# Patient Record
Sex: FEMALE | Race: WHITE | NOT HISPANIC OR LATINO | Employment: UNEMPLOYED | URBAN - NONMETROPOLITAN AREA
[De-identification: names, ages, dates, MRNs, and addresses within clinical notes are randomized per-mention and may not be internally consistent; named-entity substitution may affect disease eponyms.]

---

## 2017-01-19 ENCOUNTER — OFFICE VISIT (OUTPATIENT)
Dept: CARDIOLOGY | Facility: CLINIC | Age: 62
End: 2017-01-19

## 2017-01-19 VITALS — DIASTOLIC BLOOD PRESSURE: 79 MMHG | HEART RATE: 74 BPM | SYSTOLIC BLOOD PRESSURE: 118 MMHG

## 2017-01-19 DIAGNOSIS — E78.00 HYPERCHOLESTEREMIA: ICD-10-CM

## 2017-01-19 DIAGNOSIS — I10 ESSENTIAL HYPERTENSION: Primary | ICD-10-CM

## 2017-01-19 DIAGNOSIS — E03.9 HYPOTHYROIDISM, UNSPECIFIED TYPE: ICD-10-CM

## 2017-01-19 DIAGNOSIS — F41.9 ANXIETY: ICD-10-CM

## 2017-01-19 DIAGNOSIS — R06.02 SHORTNESS OF BREATH: ICD-10-CM

## 2017-01-19 PROCEDURE — 99213 OFFICE O/P EST LOW 20 MIN: CPT | Performed by: NURSE PRACTITIONER

## 2017-01-19 RX ORDER — ONDANSETRON 4 MG/1
4 TABLET, FILM COATED ORAL EVERY 8 HOURS PRN
COMMUNITY
End: 2018-03-08 | Stop reason: ALTCHOICE

## 2017-01-19 RX ORDER — ATORVASTATIN CALCIUM 10 MG/1
10 TABLET, FILM COATED ORAL DAILY
COMMUNITY
End: 2020-06-29

## 2017-01-19 RX ORDER — NITROGLYCERIN 0.4 MG/1
0.4 TABLET SUBLINGUAL
COMMUNITY
End: 2022-03-31

## 2017-01-19 RX ORDER — CHOLECALCIFEROL (VITAMIN D3) 125 MCG
TABLET ORAL
COMMUNITY
End: 2018-03-08 | Stop reason: DRUGHIGH

## 2017-01-19 RX ORDER — BUSPIRONE HYDROCHLORIDE 10 MG/1
10 TABLET ORAL 3 TIMES DAILY
COMMUNITY
End: 2018-03-08 | Stop reason: ALTCHOICE

## 2017-01-19 NOTE — PROGRESS NOTES
"Chief Complaint   Patient presents with   • Follow-up   • Hypertension       Subjective       Yulia Abebe is a 61 y.o. female with a history of hypertension, hypothyroidism and chest discomfort. In 2013, she underwent a cardiac cath which revealed normal coronaries and normal LV function. Today she comes to the office for a follow up appointment and denies chest pain or palpitations. She appears teary eyed and anxious which she attributes to being under a lot of family stress and \"reliving\" the death of her brother in law who committed suicide in her front yard years ago. She also has issues with abdominal bloating and epigastric tenderness for which she follows with a gastroenterologist in [place]. Recently, GI workup was done and appears she has narrowing of her colon due to adhesions.      HPI         Cardiac History:    Past Surgical History   Procedure Laterality Date   • Hysterectomy     • Appendectomy     • Echo - converted  09/18/2010     Echo- ([facility]. Dr. Barr) EF 65%   • Echo - converted  09/18/2010     D. Echo- ([facility]. Dr. Barr) Negative.   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     • atorvastatin (LIPITOR) 10 MG tablet Take 10 mg by mouth Daily.     • busPIRone (BUSPAR) 10 MG tablet Take 10 mg by mouth 3 (Three) Times a Day.     • Ergocalciferol (VITAMIN D2) 2000 UNITS tablet Take  by mouth.     • estradiol (ESTRACE) 0.5 MG tablet Take 0.5 mg by mouth daily. (1/2 tab qd)     • levothyroxine (SYNTHROID, LEVOTHROID) 88 MCG tablet Take 112 mcg by mouth " Daily.     • Linaclotide (LINZESS) 145 MCG capsule Take 145 mcg by mouth As Needed.     • nitroglycerin (NITROSTAT) 0.4 MG SL tablet Place 0.4 mg under the tongue Every 5 (Five) Minutes As Needed for chest pain. Take no more than 3 doses in 15 minutes.     • omeprazole (PriLOSEC) 20 MG capsule Take 20 mg by mouth 2 (two) times a day.     • ondansetron (ZOFRAN) 4 MG tablet Take 4 mg by mouth Every 8 (Eight) Hours As Needed for nausea or vomiting.     • clidinium-chlordiazepoxide (LIBRAX) 5-2.5 MG per capsule Take 2 capsules by mouth 3 (three) times a day as needed for indigestion.     • pravastatin (PRAVACHOL) 40 MG tablet Take 40 mg by mouth every night.     • promethazine (PHENERGAN) 25 MG tablet Take 25 mg by mouth every 4 (four) hours as needed for nausea or vomiting.     • sucralfate (CARAFATE) 1 G tablet Take 1 g by mouth 4 (four) times a day.       No current facility-administered medications for this visit.        Penicillins and Sulfa antibiotics    Past Medical History   Diagnosis Date   • Abnormal US (ultrasound) of abdomen 11/22/2010     US abdomen. No gallstones. 1.8cm benign cyst right kidney   • Abnormal US (ultrasound) of abdomen 11/22/2010     No gallstones. 1.8cm benign cyst right kidney.   • H/O: hysterectomy    • History of appendectomy    • History of basal cell cancer      removed   • Hyperlipidemia    • Hyperthyroidism    • Optional surgery      Adhesions removed as a child       Social History     Social History   • Marital status:      Spouse name: N/A   • Number of children: N/A   • Years of education: N/A     Occupational History   • Not on file.     Social History Main Topics   • Smoking status: Never Smoker   • Smokeless tobacco: Never Used   • Alcohol use No   • Drug use: No   • Sexual activity: Not on file     Other Topics Concern   • Not on file     Social History Narrative       Family History   Problem Relation Age of Onset   • Heart disease Father    • Stroke Father    •  Hypertension Father    • Heart disease Other    • Stroke Other        Review of Systems   Constitutional: Positive for fatigue. Negative for activity change, appetite change and fever.   HENT: Negative for congestion, nosebleeds, sinus pressure and trouble swallowing.    Eyes: Negative for visual disturbance.   Respiratory: Positive for shortness of breath. Negative for wheezing.    Cardiovascular: Negative for chest pain, palpitations and leg swelling.   Gastrointestinal: Positive for abdominal distention, abdominal pain and constipation. Negative for blood in stool and nausea.   Endocrine: Negative for polydipsia, polyphagia and polyuria.   Genitourinary: Negative for dysuria and hematuria.   Musculoskeletal: Negative for gait problem and myalgias.   Neurological: Negative for dizziness, syncope, speech difficulty, weakness and light-headedness.   Hematological: Does not bruise/bleed easily.   Psychiatric/Behavioral: Positive for sleep disturbance. Negative for confusion and suicidal ideas. The patient is nervous/anxious.        Diabetes- No  Thyroid-abnormal    Objective     Visit Vitals   • /79 (BP Location: Left arm, Patient Position: Sitting, Cuff Size: Adult)   • Pulse 74       Physical Exam   Constitutional: She is oriented to person, place, and time.   Eyes: Pupils are equal, round, and reactive to light.   Neck: Neck supple. No JVD present.   Cardiovascular: Normal rate, regular rhythm, S1 normal and S2 normal.    Pulses:       Radial pulses are 3+ on the right side, and 3+ on the left side.   Pulmonary/Chest: Effort normal and breath sounds normal. She has no rales.   Abdominal: She exhibits distension (softly). There is tenderness (epigastric area). There is guarding.   Musculoskeletal: She exhibits no edema.   Neurological: She is alert and oriented to person, place, and time.   Skin: Skin is warm and dry.   Psychiatric: Her speech is normal. Thought content normal. Her mood appears anxious. Her  affect is not angry. She exhibits a depressed mood. She expresses no homicidal and no suicidal ideation.   Vitals reviewed.    Procedures        Assessment/Plan      Yulia was seen today for follow-up and hypertension.    Diagnoses and all orders for this visit:    Essential hypertension    Hypercholesteremia    Shortness of breath    Hypothyroidism, unspecified type    Anxiety      She anticipates colon surgery in the future if her symptoms do not improve. Should she need cardiac clearance instructed to contact the office. From a cardiac standpoint she appears stable. I did not make any medication changes today. Regarding stress management I advised her to follow up with you. Currently, she states she is not taking Buspar due to making her lethargic.   We will see her annually, or sooner for problems.            Electronically signed by SRIKANTH Palencia,  January 19, 2017 5:12 PM

## 2017-01-19 NOTE — LETTER
"January 19, 2017     Oscar Huffman MD  [address] [place] [id number]    Patient: Yulia Abebe   YOB: 1955   Date of Visit: 1/19/2017       Dear Dr. Nathanael MD:    Yulia Abebe was in my office today. Below is a copy of my note.    If you have questions, please do not hesitate to call me. I look forward to following Yulia along with you.         Sincerely,        Nancy Slade, SRIKANTH        CC: No Recipients    Chief Complaint   Patient presents with   • Follow-up   • Hypertension       Subjective       Yulia Abebe is a 61 y.o. female with a history of hypertension, hypothyroidism and chest discomfort. In 2013, she underwent a cardiac cath which revealed normal coronaries and normal LV function. Today she comes to the office for a follow up appointment and denies chest pain or palpitations. She appears teary eyed and anxious which she attributes to being under a lot of family stress and \"reliving\" the death of her brother in law who committed suicide in her front yard years ago. She also has issues with abdominal bloating and epigastric tenderness for which she follows with a gastroenterologist in [place]. Recently, GI workup was done and appears she has narrowing of her colon due to adhesions.      HPI         Cardiac History:    Past Surgical History   Procedure Laterality Date   • Hysterectomy     • Appendectomy     • Echo - converted  09/18/2010     Echo- ([facility]. Dr. Barr) EF 65%   • Echo - converted  09/18/2010     D. Echo- ([facility]. Dr. Barr) Negative.   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     • atorvastatin (LIPITOR) 10 MG tablet Take 10 mg by mouth Daily.     • busPIRone (BUSPAR) 10 MG tablet Take 10 mg by mouth 3 (Three) Times a Day.     • Ergocalciferol (VITAMIN D2) 2000 UNITS tablet Take  by mouth.     • estradiol (ESTRACE) 0.5 MG tablet Take 0.5 mg by mouth daily. (1/2 tab qd)     • levothyroxine (SYNTHROID, LEVOTHROID) 88 MCG tablet Take 112 mcg by mouth Daily.     • Linaclotide (LINZESS) 145 MCG capsule Take 145 mcg by mouth As Needed.     • nitroglycerin (NITROSTAT) 0.4 MG SL tablet Place 0.4 mg under the tongue Every 5 (Five) Minutes As Needed for chest pain. Take no more than 3 doses in 15 minutes.     • omeprazole (PriLOSEC) 20 MG capsule Take 20 mg by mouth 2 (two) times a day.     • ondansetron (ZOFRAN) 4 MG tablet Take 4 mg by mouth Every 8 (Eight) Hours As Needed for nausea or vomiting.     • clidinium-chlordiazepoxide (LIBRAX) 5-2.5 MG per capsule Take 2 capsules by mouth 3 (three) times a day as needed for indigestion.     • pravastatin (PRAVACHOL) 40 MG tablet Take 40 mg by mouth every night.     • promethazine (PHENERGAN) 25 MG tablet Take 25 mg by mouth every 4 (four) hours as needed for nausea or vomiting.     • sucralfate (CARAFATE) 1 G tablet Take 1 g by mouth 4 (four) times a day.       No current facility-administered medications for this visit.        Penicillins and Sulfa antibiotics    Past Medical History   Diagnosis Date   • Abnormal US (ultrasound) of abdomen 11/22/2010     US abdomen. No gallstones. 1.8cm benign cyst right kidney   • Abnormal US (ultrasound) of abdomen 11/22/2010     No gallstones. 1.8cm benign cyst right kidney.   • H/O: hysterectomy    • History of appendectomy    • History of basal cell cancer      removed   • Hyperlipidemia    • Hyperthyroidism    • Optional surgery      Adhesions removed as a child       Social History     Social History   • Marital status:      Spouse  name: N/A   • Number of children: N/A   • Years of education: N/A     Occupational History   • Not on file.     Social History Main Topics   • Smoking status: Never Smoker   • Smokeless tobacco: Never Used   • Alcohol use No   • Drug use: No   • Sexual activity: Not on file     Other Topics Concern   • Not on file     Social History Narrative       Family History   Problem Relation Age of Onset   • Heart disease Father    • Stroke Father    • Hypertension Father    • Heart disease Other    • Stroke Other        Review of Systems   Constitutional: Positive for fatigue. Negative for activity change, appetite change and fever.   HENT: Negative for congestion, nosebleeds, sinus pressure and trouble swallowing.    Eyes: Negative for visual disturbance.   Respiratory: Positive for shortness of breath. Negative for wheezing.    Cardiovascular: Negative for chest pain, palpitations and leg swelling.   Gastrointestinal: Positive for abdominal distention, abdominal pain and constipation. Negative for blood in stool and nausea.   Endocrine: Negative for polydipsia, polyphagia and polyuria.   Genitourinary: Negative for dysuria and hematuria.   Musculoskeletal: Negative for gait problem and myalgias.   Neurological: Negative for dizziness, syncope, speech difficulty, weakness and light-headedness.   Hematological: Does not bruise/bleed easily.   Psychiatric/Behavioral: Positive for sleep disturbance. Negative for confusion and suicidal ideas. The patient is nervous/anxious.        Diabetes- No  Thyroid-normal    Objective     Visit Vitals   • /79 (BP Location: Left arm, Patient Position: Sitting, Cuff Size: Adult)   • Pulse 74       Physical Exam   Constitutional: She is oriented to person, place, and time.   Eyes: Pupils are equal, round, and reactive to light.   Neck: Neck supple. No JVD present.   Cardiovascular: Normal rate, regular rhythm, S1 normal and S2 normal.    Pulses:       Radial pulses are 3+ on the right  side, and 3+ on the left side.   Pulmonary/Chest: Effort normal and breath sounds normal. She has no rales.   Abdominal: She exhibits distension (softly). There is tenderness (epigastric area). There is guarding.   Musculoskeletal: She exhibits no edema.   Neurological: She is alert and oriented to person, place, and time.   Skin: Skin is warm and dry.   Psychiatric: Her speech is normal. Thought content normal. Her mood appears anxious. Her affect is not angry. She exhibits a depressed mood. She expresses no homicidal and no suicidal ideation.   Vitals reviewed.    Procedures        Assessment/Plan      There are no diagnoses linked to this encounter.    She anticipates colon surgery in the future if her symptoms do not improve. Should she need cardiac clearance instructed to contact the office. From a cardiac standpoint she appears stable. I did not make any medication changes today. Regarding stress management I advised her to follow up with you. Currently, she states she is not taking Buspar due to making her lethargic.   We will see her annually, or sooner for problems.            Electronically signed by SRIKANTH Palencia,  January 19, 2017 5:10 PM

## 2017-01-19 NOTE — MR AVS SNAPSHOT
Yulia BASIM Andrae   1/19/2017 2:30 PM   Office Visit    Dept Phone:  [phone]   Encounter #:  [id number]    Provider:  SRIKANTH Garcia   Department:  [facility] CARDIOLOGY                Your Full Care Plan              Your Updated Medication List          This list is accurate as of: 1/19/17  3:07 PM.  Always use your most recent med list.                aspirin 81 MG EC tablet       atorvastatin 10 MG tablet   Commonly known as:  LIPITOR       busPIRone 10 MG tablet   Commonly known as:  BUSPAR       clidinium-chlordiazePOXIDE 5-2.5 MG per capsule   Commonly known as:  LIBRAX       estradiol 0.5 MG tablet   Commonly known as:  ESTRACE       levothyroxine 88 MCG tablet   Commonly known as:  SYNTHROID, LEVOTHROID       LINZESS 145 MCG capsule   Generic drug:  Linaclotide       nitroglycerin 0.4 MG SL tablet   Commonly known as:  NITROSTAT       omeprazole 20 MG capsule   Commonly known as:  priLOSEC       ondansetron 4 MG tablet   Commonly known as:  ZOFRAN       pravastatin 40 MG tablet   Commonly known as:  PRAVACHOL       promethazine 25 MG tablet   Commonly known as:  PHENERGAN       sucralfate 1 G tablet   Commonly known as:  CARAFATE       Vitamin D2 2000 UNITS tablet               Instructions     None    Patient Instructions History      Upcoming Appointments     Visit Type Date Time Department    FOLLOW UP 1/19/2017  2:30 PM [facility] CAR[facility] Oscar Signup     Our records indicate that you have declined [facility]. If you would like to sign up for [email or web address], please email [email or web address] or call [phone] to obtain an activation code.             Other Info from Your Visit           Your Appointments     Jan 22, 2018 12:00 PM EST   Follow Up with SRIKANTH [place]   [facility] CARDIOLOGY (--)    [place] [id number] [phone]           Arrive 15 minutes prior to appointment.               Allergies     Penicillins      Sulfa Antibiotics        Reason for Visit     Follow-up     Hypertension           Vital Signs     Blood Pressure Pulse Smoking Status             118/79 (BP Location: Left arm, Patient Position: Sitting, Cuff Size: Adult) 74 Never Smoker

## 2017-01-19 NOTE — LETTER
"January 19, 2017     Oscar Huffman MD  [address] [place] [id number]    Patient: Yulia Abebe   YOB: 1955   Date of Visit: 1/19/2017       Dear Dr. Nathanael MD:    Yulia Abebe was in my office today. Below is a copy of my note.    If you have questions, please do not hesitate to call me. I look forward to following Yulia along with you.         Sincerely,        Nancy Slade, SRIKANTH        CC: No Recipients    Chief Complaint   Patient presents with   • Follow-up   • Hypertension       Subjective       Yulia Abebe is a 61 y.o. female with a history of hypertension, hypothyroidism and chest discomfort. In 2013, she underwent a cardiac cath which revealed normal coronaries and normal LV function. Today she comes to the office for a follow up appointment and denies chest pain or palpitations. She appears teary eyed and anxious which she attributes to being under a lot of family stress and \"reliving\" the death of her brother in law who committed suicide in her front yard years ago. She also has issues with abdominal bloating and epigastric tenderness for which she follows with a gastroenterologist in [place]. Recently, GI workup was done and appears she has narrowing of her colon due to adhesions.      HPI         Cardiac History:    Past Surgical History   Procedure Laterality Date   • Hysterectomy     • Appendectomy     • Echo - converted  09/18/2010     Echo- ([facility]. Dr. Barr) EF 65%   • Echo - converted  09/18/2010     D. Echo- ([facility]. Dr. Barr) Negative.   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     • atorvastatin (LIPITOR) 10 MG tablet Take 10 mg by mouth Daily.     • busPIRone (BUSPAR) 10 MG tablet Take 10 mg by mouth 3 (Three) Times a Day.     • Ergocalciferol (VITAMIN D2) 2000 UNITS tablet Take  by mouth.     • estradiol (ESTRACE) 0.5 MG tablet Take 0.5 mg by mouth daily. (1/2 tab qd)     • levothyroxine (SYNTHROID, LEVOTHROID) 88 MCG tablet Take 112 mcg by mouth Daily.     • Linaclotide (LINZESS) 145 MCG capsule Take 145 mcg by mouth As Needed.     • nitroglycerin (NITROSTAT) 0.4 MG SL tablet Place 0.4 mg under the tongue Every 5 (Five) Minutes As Needed for chest pain. Take no more than 3 doses in 15 minutes.     • omeprazole (PriLOSEC) 20 MG capsule Take 20 mg by mouth 2 (two) times a day.     • ondansetron (ZOFRAN) 4 MG tablet Take 4 mg by mouth Every 8 (Eight) Hours As Needed for nausea or vomiting.     • clidinium-chlordiazepoxide (LIBRAX) 5-2.5 MG per capsule Take 2 capsules by mouth 3 (three) times a day as needed for indigestion.     • pravastatin (PRAVACHOL) 40 MG tablet Take 40 mg by mouth every night.     • promethazine (PHENERGAN) 25 MG tablet Take 25 mg by mouth every 4 (four) hours as needed for nausea or vomiting.     • sucralfate (CARAFATE) 1 G tablet Take 1 g by mouth 4 (four) times a day.       No current facility-administered medications for this visit.        Penicillins and Sulfa antibiotics    Past Medical History   Diagnosis Date   • Abnormal US (ultrasound) of abdomen 11/22/2010     US abdomen. No gallstones. 1.8cm benign cyst right kidney   • Abnormal US (ultrasound) of abdomen 11/22/2010     No gallstones. 1.8cm benign cyst right kidney.   • H/O: hysterectomy    • History of appendectomy    • History of basal cell cancer      removed   • Hyperlipidemia    • Hyperthyroidism    • Optional surgery      Adhesions removed as a child       Social History     Social History   • Marital status:      Spouse  name: N/A   • Number of children: N/A   • Years of education: N/A     Occupational History   • Not on file.     Social History Main Topics   • Smoking status: Never Smoker   • Smokeless tobacco: Never Used   • Alcohol use No   • Drug use: No   • Sexual activity: Not on file     Other Topics Concern   • Not on file     Social History Narrative       Family History   Problem Relation Age of Onset   • Heart disease Father    • Stroke Father    • Hypertension Father    • Heart disease Other    • Stroke Other        Review of Systems   Constitutional: Positive for fatigue. Negative for activity change, appetite change and fever.   HENT: Negative for congestion, nosebleeds, sinus pressure and trouble swallowing.    Eyes: Negative for visual disturbance.   Respiratory: Positive for shortness of breath. Negative for wheezing.    Cardiovascular: Negative for chest pain, palpitations and leg swelling.   Gastrointestinal: Positive for abdominal distention, abdominal pain and constipation. Negative for blood in stool and nausea.   Endocrine: Negative for polydipsia, polyphagia and polyuria.   Genitourinary: Negative for dysuria and hematuria.   Musculoskeletal: Negative for gait problem and myalgias.   Neurological: Negative for dizziness, syncope, speech difficulty, weakness and light-headedness.   Hematological: Does not bruise/bleed easily.   Psychiatric/Behavioral: Positive for sleep disturbance. Negative for confusion and suicidal ideas. The patient is nervous/anxious.        Diabetes- No  Thyroid-abnormal    Objective     Visit Vitals   • /79 (BP Location: Left arm, Patient Position: Sitting, Cuff Size: Adult)   • Pulse 74       Physical Exam   Constitutional: She is oriented to person, place, and time.   Eyes: Pupils are equal, round, and reactive to light.   Neck: Neck supple. No JVD present.   Cardiovascular: Normal rate, regular rhythm, S1 normal and S2 normal.    Pulses:       Radial pulses are 3+ on the right  side, and 3+ on the left side.   Pulmonary/Chest: Effort normal and breath sounds normal. She has no rales.   Abdominal: She exhibits distension (softly). There is tenderness (epigastric area). There is guarding.   Musculoskeletal: She exhibits no edema.   Neurological: She is alert and oriented to person, place, and time.   Skin: Skin is warm and dry.   Psychiatric: Her speech is normal. Thought content normal. Her mood appears anxious. Her affect is not angry. She exhibits a depressed mood. She expresses no homicidal and no suicidal ideation.   Vitals reviewed.    Procedures        Assessment/Plan      Yulia was seen today for follow-up and hypertension.    Diagnoses and all orders for this visit:    Essential hypertension    Hypercholesteremia    Shortness of breath    Hypothyroidism, unspecified type    Anxiety      She anticipates colon surgery in the future if her symptoms do not improve. Should she need cardiac clearance instructed to contact the office. From a cardiac standpoint she appears stable. I did not make any medication changes today. Regarding stress management I advised her to follow up with you. Currently, she states she is not taking Buspar due to making her lethargic.   We will see her annually, or sooner for problems.            Electronically signed by SRIKANTH Palencia,  January 19, 2017 5:12 PM

## 2018-03-08 ENCOUNTER — OFFICE VISIT (OUTPATIENT)
Dept: CARDIOLOGY | Facility: CLINIC | Age: 63
End: 2018-03-08

## 2018-03-08 VITALS
BODY MASS INDEX: 27.11 KG/M2 | SYSTOLIC BLOOD PRESSURE: 100 MMHG | HEIGHT: 69 IN | HEART RATE: 76 BPM | DIASTOLIC BLOOD PRESSURE: 70 MMHG | WEIGHT: 183 LBS

## 2018-03-08 DIAGNOSIS — E78.2 MIXED HYPERLIPIDEMIA: Primary | ICD-10-CM

## 2018-03-08 DIAGNOSIS — F33.9 RECURRENT MAJOR DEPRESSIVE DISORDER, REMISSION STATUS UNSPECIFIED (HCC): ICD-10-CM

## 2018-03-08 DIAGNOSIS — E03.8 OTHER SPECIFIED HYPOTHYROIDISM: ICD-10-CM

## 2018-03-08 DIAGNOSIS — Q24.5 ANOMALOUS CORONARY ARTERY ORIGIN: ICD-10-CM

## 2018-03-08 DIAGNOSIS — I10 ESSENTIAL HYPERTENSION: ICD-10-CM

## 2018-03-08 PROCEDURE — 99213 OFFICE O/P EST LOW 20 MIN: CPT | Performed by: NURSE PRACTITIONER

## 2018-03-08 RX ORDER — LEVOTHYROXINE SODIUM 0.12 MG/1
125 TABLET ORAL DAILY
COMMUNITY
End: 2018-09-18 | Stop reason: DRUGHIGH

## 2018-03-08 RX ORDER — OMEPRAZOLE 40 MG/1
40 CAPSULE, DELAYED RELEASE ORAL 2 TIMES DAILY
COMMUNITY

## 2018-03-08 RX ORDER — CHOLECALCIFEROL (VITAMIN D3) 125 MCG
50 CAPSULE ORAL DAILY
COMMUNITY

## 2018-03-08 RX ORDER — PAROXETINE HYDROCHLORIDE 40 MG/1
40 TABLET, FILM COATED ORAL NIGHTLY
COMMUNITY
End: 2020-03-02
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
## 2018-09-18 ENCOUNTER — OFFICE VISIT (OUTPATIENT)
Dept: CARDIOLOGY | Facility: CLINIC | Age: 63
End: 2018-09-18

## 2018-09-18 VITALS
DIASTOLIC BLOOD PRESSURE: 70 MMHG | SYSTOLIC BLOOD PRESSURE: 110 MMHG | HEART RATE: 60 BPM | WEIGHT: 188 LBS | BODY MASS INDEX: 27.85 KG/M2 | HEIGHT: 69 IN

## 2018-09-18 DIAGNOSIS — E03.8 OTHER SPECIFIED HYPOTHYROIDISM: ICD-10-CM

## 2018-09-18 DIAGNOSIS — F41.9 ANXIETY: ICD-10-CM

## 2018-09-18 DIAGNOSIS — E78.2 MIXED HYPERLIPIDEMIA: ICD-10-CM

## 2018-09-18 DIAGNOSIS — I10 ESSENTIAL HYPERTENSION: ICD-10-CM

## 2018-09-18 PROBLEM — F33.41 RECURRENT MAJOR DEPRESSIVE DISORDER, IN PARTIAL REMISSION (HCC): Status: RESOLVED | Noted: 2018-09-18 | Resolved: 2018-09-18

## 2018-09-18 PROBLEM — F33.41 RECURRENT MAJOR DEPRESSIVE DISORDER, IN PARTIAL REMISSION (HCC): Status: ACTIVE | Noted: 2018-09-18

## 2018-09-18 PROCEDURE — 99213 OFFICE O/P EST LOW 20 MIN: CPT | Performed by: NURSE PRACTITIONER

## 2018-09-18 RX ORDER — LEVOTHYROXINE SODIUM 112 UG/1
125 TABLET ORAL DAILY
COMMUNITY
End: 2021-09-23 | Stop reason: ALTCHOICE

## 2018-09-18 NOTE — PATIENT INSTRUCTIONS
"DASH Eating Plan  DASH stands for \"Dietary Approaches to Stop Hypertension.\" The DASH eating plan is a healthy eating plan that has been shown to reduce high blood pressure (hypertension). It may also reduce your risk for type 2 diabetes, heart disease, and stroke. The DASH eating plan may also help with weight loss.  What are tips for following this plan?  General guidelines  · Avoid eating more than 2,300 mg (milligrams) of salt (sodium) a day. If you have hypertension, you may need to reduce your sodium intake to 1,500 mg a day.  · Limit alcohol intake to no more than 1 drink a day for nonpregnant women and 2 drinks a day for men. One drink equals 12 oz of beer, 5 oz of wine, or 1½ oz of hard liquor.  · Work with your health care provider to maintain a healthy body weight or to lose weight. Ask what an ideal weight is for you.  · Get at least 30 minutes of exercise that causes your heart to beat faster (aerobic exercise) most days of the week. Activities may include walking, swimming, or biking.  · Work with your health care provider or diet and nutrition specialist (dietitian) to adjust your eating plan to your individual calorie needs.  Reading food labels  · Check food labels for the amount of sodium per serving. Choose foods with less than 5 percent of the Daily Value of sodium. Generally, foods with less than 300 mg of sodium per serving fit into this eating plan.  · To find whole grains, look for the word \"whole\" as the first word in the ingredient list.  Shopping  · Buy products labeled as \"low-sodium\" or \"no salt added.\"  · Buy fresh foods. Avoid canned foods and premade or frozen meals.  Cooking  · Avoid adding salt when cooking. Use salt-free seasonings or herbs instead of table salt or sea salt. Check with your health care provider or pharmacist before using salt substitutes.  · Do not aquino foods. Cook foods using healthy methods such as baking, boiling, grilling, and broiling instead.  · Cook with " heart-healthy oils, such as olive, canola, soybean, or sunflower oil.  Meal planning    · Eat a balanced diet that includes:  ? 5 or more servings of fruits and vegetables each day. At each meal, try to fill half of your plate with fruits and vegetables.  ? Up to 6-8 servings of whole grains each day.  ? Less than 6 oz of lean meat, poultry, or fish each day. A 3-oz serving of meat is about the same size as a deck of cards. One egg equals 1 oz.  ? 2 servings of low-fat dairy each day.  ? A serving of nuts, seeds, or beans 5 times each week.  ? Heart-healthy fats. Healthy fats called Omega-3 fatty acids are found in foods such as flaxseeds and coldwater fish, like sardines, salmon, and mackerel.  · Limit how much you eat of the following:  ? Canned or prepackaged foods.  ? Food that is high in trans fat, such as fried foods.  ? Food that is high in saturated fat, such as fatty meat.  ? Sweets, desserts, sugary drinks, and other foods with added sugar.  ? Full-fat dairy products.  · Do not salt foods before eating.  · Try to eat at least 2 vegetarian meals each week.  · Eat more home-cooked food and less restaurant, buffet, and fast food.  · When eating at a restaurant, ask that your food be prepared with less salt or no salt, if possible.  What foods are recommended?  The items listed may not be a complete list. Talk with your dietitian about what dietary choices are best for you.  Grains  Whole-grain or whole-wheat bread. Whole-grain or whole-wheat pasta. Brown rice. Oatmeal. Quinoa. Bulgur. Whole-grain and low-sodium cereals. Grace bread. Low-fat, low-sodium crackers. Whole-wheat flour tortillas.  Vegetables  Fresh or frozen vegetables (raw, steamed, roasted, or grilled). Low-sodium or reduced-sodium tomato and vegetable juice. Low-sodium or reduced-sodium tomato sauce and tomato paste. Low-sodium or reduced-sodium canned vegetables.  Fruits  All fresh, dried, or frozen fruit. Canned fruit in natural juice (without  added sugar).  Meat and other protein foods  Skinless chicken or turkey. Ground chicken or turkey. Pork with fat trimmed off. Fish and seafood. Egg whites. Dried beans, peas, or lentils. Unsalted nuts, nut butters, and seeds. Unsalted canned beans. Lean cuts of beef with fat trimmed off. Low-sodium, lean deli meat.  Dairy  Low-fat (1%) or fat-free (skim) milk. Fat-free, low-fat, or reduced-fat cheeses. Nonfat, low-sodium ricotta or cottage cheese. Low-fat or nonfat yogurt. Low-fat, low-sodium cheese.  Fats and oils  Soft margarine without trans fats. Vegetable oil. Low-fat, reduced-fat, or light mayonnaise and salad dressings (reduced-sodium). Canola, safflower, olive, soybean, and sunflower oils. Avocado.  Seasoning and other foods  Herbs. Spices. Seasoning mixes without salt. Unsalted popcorn and pretzels. Fat-free sweets.  What foods are not recommended?  The items listed may not be a complete list. Talk with your dietitian about what dietary choices are best for you.  Grains  Baked goods made with fat, such as croissants, muffins, or some breads. Dry pasta or rice meal packs.  Vegetables  Creamed or fried vegetables. Vegetables in a cheese sauce. Regular canned vegetables (not low-sodium or reduced-sodium). Regular canned tomato sauce and paste (not low-sodium or reduced-sodium). Regular tomato and vegetable juice (not low-sodium or reduced-sodium). Pickles. Olives.  Fruits  Canned fruit in a light or heavy syrup. Fried fruit. Fruit in cream or butter sauce.  Meat and other protein foods  Fatty cuts of meat. Ribs. Fried meat. Ugarte. Sausage. Bologna and other processed lunch meats. Salami. Fatback. Hotdogs. Bratwurst. Salted nuts and seeds. Canned beans with added salt. Canned or smoked fish. Whole eggs or egg yolks. Chicken or turkey with skin.  Dairy  Whole or 2% milk, cream, and half-and-half. Whole or full-fat cream cheese. Whole-fat or sweetened yogurt. Full-fat cheese. Nondairy creamers. Whipped toppings.  Processed cheese and cheese spreads.  Fats and oils  Butter. Stick margarine. Lard. Shortening. Ghee. Ugarte fat. Tropical oils, such as coconut, palm kernel, or palm oil.  Seasoning and other foods  Salted popcorn and pretzels. Onion salt, garlic salt, seasoned salt, table salt, and sea salt. Worcestershire sauce. Tartar sauce. Barbecue sauce. Teriyaki sauce. Soy sauce, including reduced-sodium. Steak sauce. Canned and packaged gravies. Fish sauce. Oyster sauce. Cocktail sauce. Horseradish that you find on the shelf. Ketchup. Mustard. Meat flavorings and tenderizers. Bouillon cubes. Hot sauce and Tabasco sauce. Premade or packaged marinades. Premade or packaged taco seasonings. Relishes. Regular salad dressings.  Where to find more information:  · National Heart, Lung, and Blood [place]: www.nhlbi.nih.gov  · American Heart Association: www.heart.org  Summary  · The DASH eating plan is a healthy eating plan that has been shown to reduce high blood pressure (hypertension). It may also reduce your risk for type 2 diabetes, heart disease, and stroke.  · With the DASH eating plan, you should limit salt (sodium) intake to 2,300 mg a day. If you have hypertension, you may need to reduce your sodium intake to 1,500 mg a day.  · When on the DASH eating plan, aim to eat more fresh fruits and vegetables, whole grains, lean proteins, low-fat dairy, and heart-healthy fats.  · Work with your health care provider or diet and nutrition specialist (dietitian) to adjust your eating plan to your individual calorie needs.  This information is not intended to replace advice given to you by your health care provider. Make sure you discuss any questions you have with your health care provider.  Document Released: 12/06/2012 Document Revised: 12/11/2017 Document Reviewed: 12/11/2017  [employer] Interactive Patient Education © 2018 [employer]

## 2018-11-08 ENCOUNTER — TELEPHONE (OUTPATIENT)
Dept: CARDIOLOGY | Facility: CLINIC | Age: 63
End: 2018-11-08

## 2018-11-08 DIAGNOSIS — I20.8 STABLE ANGINA PECTORIS (HCC): ICD-10-CM

## 2018-11-08 DIAGNOSIS — E78.2 MIXED HYPERLIPIDEMIA: Primary | ICD-10-CM

## 2018-11-08 DIAGNOSIS — R06.02 SHORTNESS OF BREATH: ICD-10-CM

## 2018-11-08 NOTE — TELEPHONE ENCOUNTER
"Patient called reporting that she went to ER due to chest pain yesterday with nausea and dizziness, was told to contact cardiology today. She has had no medication changes since last visit except went to local family practice a week ago due to cough with bronchitis, \"had 2 rocephin injections with steroid and cough syrup with codeine\". Has appointment with PCP 9:15am on Monday.     She reports was told in ambulance yesterday that B/P was fluctuating.      "

## 2018-11-08 NOTE — TELEPHONE ENCOUNTER
ER notes reviewed. Troponin negative and EKG normal. The chest pain may be related to bronchitis.     Recommend Lexiscan stress and echo. Her last cardiac work up was 5 years ago.

## 2018-11-08 NOTE — TELEPHONE ENCOUNTER
Patient made aware of recommendations, patient verbalized understanding and willing to have stress and echo. Will you place order? Thanks

## 2018-11-15 ENCOUNTER — HOSPITAL ENCOUNTER (OUTPATIENT)
Dept: CARDIOLOGY | Facility: HOSPITAL | Age: 63
Discharge: HOME | End: 2018-11-15

## 2018-11-15 DIAGNOSIS — I20.8 STABLE ANGINA PECTORIS (HCC): ICD-10-CM

## 2018-11-15 DIAGNOSIS — E78.2 MIXED HYPERLIPIDEMIA: ICD-10-CM

## 2018-11-15 DIAGNOSIS — R06.02 SHORTNESS OF BREATH: ICD-10-CM

## 2018-11-15 PROCEDURE — 93018 CV STRESS TEST I&R ONLY: CPT | Performed by: INTERNAL MEDICINE

## 2018-11-15 PROCEDURE — 93306 TTE W/DOPPLER COMPLETE: CPT | Performed by: INTERNAL MEDICINE

## 2018-11-15 PROCEDURE — 78452 HT MUSCLE IMAGE SPECT MULT: CPT

## 2018-11-15 PROCEDURE — 0 TECHNETIUM SESTAMIBI: Performed by: INTERNAL MEDICINE

## 2018-11-15 PROCEDURE — 93306 TTE W/DOPPLER COMPLETE: CPT

## 2018-11-15 PROCEDURE — A9500 TC99M SESTAMIBI: HCPCS | Performed by: INTERNAL MEDICINE

## 2018-11-15 PROCEDURE — 93017 CV STRESS TEST TRACING ONLY: CPT

## 2018-11-15 PROCEDURE — 25010000002 REGADENOSON 0.4 MG/5ML SOLUTION: Performed by: INTERNAL MEDICINE

## 2018-11-15 PROCEDURE — 78452 HT MUSCLE IMAGE SPECT MULT: CPT | Performed by: INTERNAL MEDICINE

## 2018-11-15 RX ADMIN — TECHNETIUM TC 99M SESTAMIBI 1 DOSE: 1 INJECTION INTRAVENOUS at 09:33

## 2018-11-15 RX ADMIN — REGADENOSON 0.4 MG: 0.08 INJECTION, SOLUTION INTRAVENOUS at 09:32

## 2018-11-15 RX ADMIN — TECHNETIUM TC 99M SESTAMIBI 1 DOSE: 1 INJECTION INTRAVENOUS at 09:32

## 2018-11-16 LAB
BH CV ECHO MEAS - ACS: 2.1 CM
BH CV ECHO MEAS - AO MEAN PG: 4.2 MMHG
BH CV ECHO MEAS - AO ROOT AREA (BSA CORRECTED): 1.4
BH CV ECHO MEAS - AO ROOT AREA: 6.2 CM^2
BH CV ECHO MEAS - AO ROOT DIAM: 2.8 CM
BH CV ECHO MEAS - AO V2 MEAN: 96.8 CM/SEC
BH CV ECHO MEAS - AO V2 VTI: 32.2 CM
BH CV ECHO MEAS - BSA(HAYCOCK): 2.1 M^2
BH CV ECHO MEAS - BSA: 2 M^2
BH CV ECHO MEAS - BZI_BMI: 27.8 KILOGRAMS/M^2
BH CV ECHO MEAS - BZI_METRIC_HEIGHT: 175.3 CM
BH CV ECHO MEAS - BZI_METRIC_WEIGHT: 85.3 KG
BH CV ECHO MEAS - EDV(CUBED): 84.3 ML
BH CV ECHO MEAS - EDV(MOD-SP4): 59 ML
BH CV ECHO MEAS - EDV(TEICH): 87 ML
BH CV ECHO MEAS - EF(CUBED): 70.5 %
BH CV ECHO MEAS - EF(MOD-SP4): 64.4 %
BH CV ECHO MEAS - EF(TEICH): 62.4 %
BH CV ECHO MEAS - ESV(CUBED): 24.9 ML
BH CV ECHO MEAS - ESV(MOD-SP4): 21 ML
BH CV ECHO MEAS - ESV(TEICH): 32.7 ML
BH CV ECHO MEAS - FS: 33.4 %
BH CV ECHO MEAS - IVS/LVPW: 0.99
BH CV ECHO MEAS - IVSD: 1 CM
BH CV ECHO MEAS - LA DIMENSION: 3.6 CM
BH CV ECHO MEAS - LA/AO: 1.3
BH CV ECHO MEAS - LV DIASTOLIC VOL/BSA (35-75): 29.3 ML/M^2
BH CV ECHO MEAS - LV IVRT: 0.1 SEC
BH CV ECHO MEAS - LV MASS(C)D: 158.1 GRAMS
BH CV ECHO MEAS - LV MASS(C)DI: 78.6 GRAMS/M^2
BH CV ECHO MEAS - LV SYSTOLIC VOL/BSA (12-30): 10.4 ML/M^2
BH CV ECHO MEAS - LVIDD: 4.4 CM
BH CV ECHO MEAS - LVIDS: 2.9 CM
BH CV ECHO MEAS - LVLD AP4: 6.3 CM
BH CV ECHO MEAS - LVLS AP4: 5.2 CM
BH CV ECHO MEAS - LVOT AREA (M): 3.1 CM^2
BH CV ECHO MEAS - LVOT AREA: 3.3 CM^2
BH CV ECHO MEAS - LVOT DIAM: 2 CM
BH CV ECHO MEAS - LVPWD: 1.1 CM
BH CV ECHO MEAS - MV A MAX VEL: 74 CM/SEC
BH CV ECHO MEAS - MV DEC SLOPE: 425.5 CM/SEC^2
BH CV ECHO MEAS - MV E MAX VEL: 87.9 CM/SEC
BH CV ECHO MEAS - MV E/A: 1.2
BH CV ECHO MEAS - RVDD: 2.9 CM
BH CV ECHO MEAS - SI(AO): 98.9 ML/M^2
BH CV ECHO MEAS - SI(CUBED): 29.6 ML/M^2
BH CV ECHO MEAS - SI(MOD-SP4): 18.9 ML/M^2
BH CV ECHO MEAS - SI(TEICH): 27 ML/M^2
BH CV ECHO MEAS - SV(AO): 199 ML
BH CV ECHO MEAS - SV(CUBED): 59.5 ML
BH CV ECHO MEAS - SV(MOD-SP4): 38 ML
BH CV ECHO MEAS - SV(TEICH): 54.3 ML
BH CV NUCLEAR PRIOR STUDY: 3
BH CV STRESS COMMENTS STAGE 1: NORMAL
BH CV STRESS DOSE REGADENOSON STAGE 1: 0.4
BH CV STRESS DURATION MIN STAGE 1: 0
BH CV STRESS DURATION SEC STAGE 1: 10
BH CV STRESS PROTOCOL 1: NORMAL
BH CV STRESS RECOVERY BP: NORMAL MMHG
BH CV STRESS RECOVERY HR: 80 BPM
BH CV STRESS STAGE 1: 1
LV EF NUC BP: 76 %
MAXIMAL PREDICTED HEART RATE: 158 BPM
MAXIMAL PREDICTED HEART RATE: 158 BPM
PERCENT MAX PREDICTED HR: 60.76 %
STRESS BASELINE BP: NORMAL MMHG
STRESS BASELINE HR: 65 BPM
STRESS PERCENT HR: 71 %
STRESS POST PEAK BP: NORMAL MMHG
STRESS POST PEAK HR: 96 BPM
STRESS TARGET HR: 134 BPM
STRESS TARGET HR: 134 BPM

## 2018-12-11 ENCOUNTER — OFFICE VISIT (OUTPATIENT)
Dept: GASTROENTEROLOGY | Facility: CLINIC | Age: 63
End: 2018-12-11

## 2018-12-11 VITALS — HEART RATE: 92 BPM | DIASTOLIC BLOOD PRESSURE: 78 MMHG | SYSTOLIC BLOOD PRESSURE: 143 MMHG

## 2018-12-11 DIAGNOSIS — R10.13 EPIGASTRIC PAIN: ICD-10-CM

## 2018-12-11 DIAGNOSIS — K21.9 GASTROESOPHAGEAL REFLUX DISEASE WITHOUT ESOPHAGITIS: Primary | ICD-10-CM

## 2018-12-11 DIAGNOSIS — R13.19 ESOPHAGEAL DYSPHAGIA: ICD-10-CM

## 2018-12-11 PROCEDURE — 99204 OFFICE O/P NEW MOD 45 MIN: CPT | Performed by: INTERNAL MEDICINE

## 2018-12-11 NOTE — PROGRESS NOTES
"PCP:  Elissa Stoddard APRN     No referring provider defined for this encounter.    Chief Complaint   Patient presents with   • Abdominal Pain        HPI   The patient is here with abdominal pain.  This is primarily in the epigastrium.  This came on suddenly around November 7.  She was shopping.  The pain eased off to some degree.  She sought medical attention.  She had a workup and was thought to not be cardiac in origin.  She did get nitroglycerin as well as a GI cocktail, and neither helped the pain.  She has intermittently had this trouble since.  The pain  seems to be worse with position and movement.  The pain is especially bad with twisting at times.  She does have reflux symptoms.  When she bends over she does get a reflux symptoms up into the mouth.  She occasionally has trouble swallowing.  It almost sounds like a spasm where she gets a \"knot and\" in the throat.  This relaxes on its own.  She has been on Carafate as well as omeprazole.  She is understandably concerned.  She is referred for evaluation.    Allergies   Allergen Reactions   • Sulfa Antibiotics Swelling   • Penicillins Rash          Current Outpatient Medications:   •  aspirin 81 MG EC tablet, Take 81 mg by mouth daily., Disp: , Rfl:   •  atorvastatin (LIPITOR) 10 MG tablet, Take 10 mg by mouth Daily., Disp: , Rfl:   •  Cholecalciferol (VITAMIN D3) 2000 units tablet, Take  by mouth Daily., Disp: , Rfl:   •  clidinium-chlordiazepoxide (LIBRAX) 5-2.5 MG per capsule, Take 1 capsule by mouth Every 6 (Six) Hours., Disp: , Rfl:   •  estradiol (ESTRACE) 0.5 MG tablet, Take 0.25 mg by mouth Daily., Disp: , Rfl:   •  levothyroxine (SYNTHROID, LEVOTHROID) 112 MCG tablet, Take 112 mcg by mouth Daily., Disp: , Rfl:   •  Linaclotide (LINZESS) 145 MCG capsule, Take 145 mcg by mouth As Needed., Disp: , Rfl:   •  nitroglycerin (NITROSTAT) 0.4 MG SL tablet, Place 0.4 mg under the tongue Every 5 (Five) Minutes As Needed for chest pain. Take no more than 3 doses " in 15 minutes., Disp: , Rfl:   •  omeprazole (priLOSEC) 40 MG capsule, Take 40 mg by mouth 2 (Two) Times a Day., Disp: , Rfl:   •  PARoxetine (PAXIL) 40 MG tablet, Take 40 mg by mouth Every Night., Disp: , Rfl:   •  promethazine (PHENERGAN) 25 MG tablet, Take 25 mg by mouth every 4 (four) hours as needed for nausea or vomiting., Disp: , Rfl:   •  sucralfate (CARAFATE) 1 G tablet, Take 1 g by mouth 4 (four) times a day., Disp: , Rfl:      Past Medical History:   Diagnosis Date   • Abnormal US (ultrasound) of abdomen 11/22/2010    US abdomen. No gallstones. 1.8cm benign cyst right kidney   • Abnormal US (ultrasound) of abdomen 11/22/2010    No gallstones. 1.8cm benign cyst right kidney.   • Anxiety and depression    • H/O: hysterectomy BSO, cholecystectomy for stones     • History of appendectomy, rotator cuff surgery     • History of basal cell cancer     removed   • Hyperlipidemia    • Hyperthyroidism    • Optional surgery     Adhesions removed as a child       Past Surgical History:   Procedure Laterality Date   • CARDIAC CATHETERIZATION  10/25/2013    Anomalous origin of RCA, normal coronaries, EF65%   • CARDIOVASCULAR STRESS TEST  03/14/2012    Stress- 9min, 85% THR./72, negative for ischemia   • CARDIOVASCULAR STRESS TEST  10/24/2013    L.Myoview- ([facility]) small apical infarct versus breast attenuation   • CARDIOVASCULAR STRESS TEST  11/15/2018    L. Cardiolite- Negative. EF 76%.   • ECHO - CONVERTED  09/18/2010    Echo- ([facility]. Dr. Barr) EF 65%   • ECHO - CONVERTED  09/18/2010    D. Echo- ([facility]. Dr. Barr) Negative.   • ECHO - CONVERTED  03/14/2012    Echo- EF 65-70%   • ECHO - CONVERTED  11/15/2018    EF 55-60%. Mild MR.        Social History     Socioeconomic History   • Marital status:      Spouse name: Not on file   • Number of children: Not on file   • Years of education: Not on file   • Highest education level: Not on file   Social Needs   • Financial resource strain: Not on file   • Food  insecurity - worry: Not on file   • Food insecurity - inability: Not on file   • Transportation needs - medical: Not on file   • Transportation needs - non-medical: Not on file   Occupational History   • Not on file   Tobacco Use   • Smoking status: Never Smoker   • Smokeless tobacco: Never Used   Substance and Sexual Activity   • Alcohol use: No   • Drug use: No   • Sexual activity: Not on file   Other Topics Concern   • Not on file   Social History Narrative   • Not on file        Family History   Problem Relation Age of Onset   • Heart disease Father    • Stroke Father    • Hypertension Father    • Heart disease Other    • Stroke Other         Review of Systems   Constitutional: Negative for unexpected weight loss.   HENT: Negative for trouble swallowing.    Eyes: Negative.    Respiratory: Negative.    Gastrointestinal: Positive for abdominal pain and nausea. Negative for abdominal distention, anal bleeding, blood in stool, constipation, diarrhea, rectal pain, vomiting, GERD and indigestion.   Endocrine: Negative.    Genitourinary: Negative.    Musculoskeletal: Negative.    Skin: Negative.    Allergic/Immunologic: Negative.    Neurological: Negative.    Hematological: Negative.    Psychiatric/Behavioral: Negative.         Vitals:    12/11/18 1515   BP: 143/78   Pulse: 92        Physical Exam   General Appearance: Alert, in no acute distress   Head: Normocephalic, without obvious abnormality, atraumatic   Eyes: Lids and lashes normal, conjunctivae and sclerae normal, no icterus, no pallor, corneas clear, PERRLA   Throat: No oral lesions, no thrush, oral mucosa moist   Neck: No adenopathy, supple, trachea midline, no thyromegaly, no JVD   Lungs: Clear to auscultation,respirations regular, even and unlabored Heart: Regular rhythm and normal rate, normal S1 and S2, no murmur, no gallop, no rub, no click   Abdomen: Normal bowel sounds, no masses, no organomegaly, soft non-tender, non-distended, no guarding, no rebound  tenderness   Extremities: Moves all extremities well, no edema, no cyanosis, no redness   Skin: No bleeding, bruising or rash   Neurologic: Cranial nerves 2 - 12 grossly intact, no focal deficits       Yulia was seen today for abdominal pain.    Diagnoses and all orders for this visit:    Gastroesophageal reflux disease without esophagitis    Epigastric pain    Esophageal dysphagia    The patient has epigastric pain which is quite sharp but somewhat fleeting.  She does have some lingering pain afterwards.  This doesn't relate to meals definitively.  It seems to be worse with motion and twisting.  This could suggest more of a musculoskeletal etiology.  She does have reflux symptoms.  She has reflux when she bends over.  She does have some dysphagia.  Her dysphagia almost sounds like esophageal spasm.  She has not had to vomit up any food.  The sharp caliber of the pain is also not is characteristic.  The fleeting nature is also not is characteristic of GI pain.  I am going to suggest an upper endoscopy.  We will look for any signs of esophagitis or ulceration, eosinophilic esophagitis, large hernia such as a paraesophageal hernia, or peptic disease.  I would continue the same medicines at this point.  From a health maintenance standpoint, she had a colonoscopy in October 2016 did not be due from that point of view.  If her pain continues we may ultimately need to check a CAT scan.     Ernesto Mirza MD

## 2018-12-12 ENCOUNTER — OUTSIDE FACILITY SERVICE (OUTPATIENT)
Dept: GASTROENTEROLOGY | Facility: CLINIC | Age: 63
End: 2018-12-12

## 2018-12-12 PROBLEM — K21.9 GASTROESOPHAGEAL REFLUX DISEASE WITHOUT ESOPHAGITIS: Status: ACTIVE | Noted: 2018-12-12

## 2018-12-12 PROBLEM — R10.13 EPIGASTRIC PAIN: Status: ACTIVE | Noted: 2018-12-12

## 2018-12-12 PROBLEM — R13.19 ESOPHAGEAL DYSPHAGIA: Status: ACTIVE | Noted: 2018-12-12

## 2018-12-12 PROCEDURE — 43239 EGD BIOPSY SINGLE/MULTIPLE: CPT | Performed by: INTERNAL MEDICINE

## 2018-12-26 DIAGNOSIS — R10.13 EPIGASTRIC PAIN: Primary | ICD-10-CM

## 2019-02-07 ENCOUNTER — LAB (OUTPATIENT)
Dept: LAB | Facility: HOSPITAL | Age: 64
End: 2019-02-07

## 2019-02-07 DIAGNOSIS — R10.13 EPIGASTRIC PAIN: ICD-10-CM

## 2019-02-07 PROCEDURE — 83516 IMMUNOASSAY NONANTIBODY: CPT

## 2019-02-07 PROCEDURE — 86255 FLUORESCENT ANTIBODY SCREEN: CPT

## 2019-02-07 PROCEDURE — 82784 ASSAY IGA/IGD/IGG/IGM EACH: CPT

## 2019-02-08 LAB
ENDOMYSIUM IGA SER QL: NEGATIVE
GLIADIN PEPTIDE IGA SER-ACNC: 4 UNITS (ref 0–19)
GLIADIN PEPTIDE IGG SER-ACNC: 4 UNITS (ref 0–19)
IGA SERPL-MCNC: 265 MG/DL (ref 87–352)
TTG IGA SER-ACNC: <2 U/ML (ref 0–3)
TTG IGG SER-ACNC: <2 U/ML (ref 0–5)

## 2019-02-12 ENCOUNTER — OFFICE VISIT (OUTPATIENT)
Dept: GASTROENTEROLOGY | Facility: CLINIC | Age: 64
End: 2019-02-12

## 2019-02-12 VITALS
DIASTOLIC BLOOD PRESSURE: 77 MMHG | BODY MASS INDEX: 27.45 KG/M2 | HEART RATE: 75 BPM | SYSTOLIC BLOOD PRESSURE: 122 MMHG | WEIGHT: 186 LBS

## 2019-02-12 DIAGNOSIS — R10.32 LEFT LOWER QUADRANT PAIN: Primary | ICD-10-CM

## 2019-02-12 DIAGNOSIS — K62.5 RECTAL BLEEDING: ICD-10-CM

## 2019-02-12 PROCEDURE — 99214 OFFICE O/P EST MOD 30 MIN: CPT | Performed by: INTERNAL MEDICINE

## 2019-02-12 NOTE — PROGRESS NOTES
PCP:  Elissa Stoddard APRN     No referring provider defined for this encounter.    Chief Complaint   Patient presents with   • Follow-up     follow up EGD/lab        HPI   The patient is a 63-year-old female who states that she had some episodes of bloody diarrhea yesterday.  She had 3 episodes.  Today she woke up and had a small amount of blood as well.  She had an upper endoscopy done on 12/12/18.  This showed a small hiatal hernia and gastritis.  Biopsies did show changes suspicious for celiac disease although the serologies were completely normal.  She has some mild tenderness in the lower abdomen.  It appears that her last colonoscopy showed internal hemorrhoids and fixation of the sigmoid due to adhesions.  There was narrowing in that area as we had to use an upper scope to traverse the region.  She has a brother with colon cancer.    She denies any fevers.        Allergies   Allergen Reactions   • Sulfa Antibiotics Swelling   • Penicillins Rash          Current Outpatient Medications:   •  aspirin 81 MG EC tablet, Take 81 mg by mouth daily., Disp: , Rfl:   •  atorvastatin (LIPITOR) 10 MG tablet, Take 10 mg by mouth Daily., Disp: , Rfl:   •  Cholecalciferol (VITAMIN D3) 2000 units tablet, Take  by mouth Daily., Disp: , Rfl:   •  clidinium-chlordiazepoxide (LIBRAX) 5-2.5 MG per capsule, Take 1 capsule by mouth Every 6 (Six) Hours., Disp: , Rfl:   •  estradiol (ESTRACE) 0.5 MG tablet, Take 0.25 mg by mouth Daily., Disp: , Rfl:   •  levothyroxine (SYNTHROID, LEVOTHROID) 112 MCG tablet, Take 112 mcg by mouth Daily., Disp: , Rfl:   •  Linaclotide (LINZESS) 145 MCG capsule, Take 145 mcg by mouth As Needed., Disp: , Rfl:   •  nitroglycerin (NITROSTAT) 0.4 MG SL tablet, Place 0.4 mg under the tongue Every 5 (Five) Minutes As Needed for chest pain. Take no more than 3 doses in 15 minutes., Disp: , Rfl:   •  omeprazole (priLOSEC) 40 MG capsule, Take 40 mg by mouth 2 (Two) Times a Day., Disp: , Rfl:   •  PARoxetine  (PAXIL) 40 MG tablet, Take 40 mg by mouth Every Night., Disp: , Rfl:   •  promethazine (PHENERGAN) 25 MG tablet, Take 25 mg by mouth every 4 (four) hours as needed for nausea or vomiting., Disp: , Rfl:   •  sucralfate (CARAFATE) 1 G tablet, Take 1 g by mouth 4 (four) times a day., Disp: , Rfl:      Past Medical History:   Diagnosis Date   • Abnormal US (ultrasound) of abdomen 11/22/2010    US abdomen. No gallstones. 1.8cm benign cyst right kidney   • Abnormal US (ultrasound) of abdomen 11/22/2010    No gallstones. 1.8cm benign cyst right kidney.   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 Cardiolite- Negative. EF 76%.   • ECHO - CONVERTED  09/18/2010    Echo- ([facility]. Dr. Barr) EF 65%   • ECHO - CONVERTED  09/18/2010    D. Echo- ([facility]. Dr. Barr) Negative.   • ECHO - CONVERTED  03/14/2012    Echo- EF 65-70%   • ECHO - CONVERTED  11/15/2018    EF 55-60%. Mild MR.        Social History     Socioeconomic History   • Marital status:      Spouse name: Not on file   • Number of children: Not on file   • Years of education: Not on file   • Highest education level: Not on file   Social Needs   • Financial resource strain: Not on file   • Food insecurity - worry: Not on file   • Food insecurity - inability: Not on file   • Transportation needs - medical: Not on file   • Transportation needs - non-medical: Not on file   Occupational History   •  Not on file   Tobacco Use   • Smoking status: Never Smoker   • Smokeless tobacco: Never Used   Substance and Sexual Activity   • Alcohol use: No   • Drug use: No   • Sexual activity: Not on file   Other Topics Concern   • Not on file   Social History Narrative   • Not on file        Family History   Problem Relation Age of Onset   • Heart disease Father    • Stroke Father    • Hypertension Father    • Heart disease Other    • Stroke Other         Review of Systems   Constitutional: Negative for unexpected weight loss.   HENT: Negative for trouble swallowing.    Eyes: Negative.    Respiratory: Negative.    Gastrointestinal: Negative for abdominal distention, abdominal pain, anal bleeding, blood in stool, constipation, diarrhea, nausea, rectal pain, vomiting, GERD and indigestion.   Endocrine: Negative.    Genitourinary: Negative.    Musculoskeletal: Negative.    Skin: Negative.    Allergic/Immunologic: Negative.    Neurological: Negative.    Hematological: Negative.    Psychiatric/Behavioral: Negative.         Vitals:    02/12/19 1342   BP: 122/77   Pulse: 75        Physical Exam   General Appearance: Alert, in no acute distress   Head: Normocephalic, without obvious abnormality, atraumatic   Eyes: Lids and lashes normal, conjunctivae and sclerae normal, no icterus, no pallor, corneas clear, PERRLA   Ears: Ears appear intact with no abnormalities noted   Throat: No oral lesions, no thrush, oral mucosa moist   Neck: No adenopathy, supple, trachea midline, no thyromegaly, no JVD   Lungs: Clear to auscultation,respirations regular, even and unlabored Heart: Regular rhythm and normal rate, normal S1 and S2, no murmur, no gallop, no rub, no click   Chest Wall: Symmetrical respiratory expansion   Abdomen: Normal bowel sounds, no masses, no organomegaly, soft non-tender, non-distended, no guarding, no rebound tenderness   Extremities: Moves all extremities well, no edema, no cyanosis, no redness   Skin: No bleeding,  bruising or rash   Neurologic: Cranial nerves 2 - 12 grossly intact, no focal deficits       Yulia was seen today for follow-up.    Diagnoses and all orders for this visit:    Left lower quadrant pain  -     CT Abdomen Pelvis Without Contrast; Future    Rectal bleeding    Impressions and plan #1 rectal bleeding and abdominal discomfort: This certainly would be consistent with ischemic colitis.  If that is the case this should resolve in most patients.  She has no fever and no peritoneal signs.  If she worsens she is going to go to the emergency room.  We ultimately will likely have to reevaluate the colon.  If she improves and her symptoms resolve, I would like to wait 6-8 weeks.  If she continues to have troubles we may need to evaluate earlier.  I would like to check a CAT scan.  I will do the CAT scan without contrast that she's had some mild renal issues in the past.  This should so some thickening in the colon in the region of the hemorrhage if it is indeed ischemic colitis.  Infectious colitis could cause similar symptoms but usually there is more diarrhea.  Interestingly, she had changes suspicious for celiac disease on a recent biopsies.  Her serologies were entirely normal and previous biopsies were negative as well.  We will have to entertain that as well.  The biopsies generally are thought to be the gold standard.    Ernesto Mirza MD

## 2019-02-13 ENCOUNTER — DOCUMENTATION (OUTPATIENT)
Dept: GASTROENTEROLOGY | Facility: CLINIC | Age: 64
End: 2019-02-13

## 2019-02-13 PROBLEM — K62.5 RECTAL BLEEDING: Status: ACTIVE | Noted: 2019-02-13

## 2019-02-13 PROBLEM — R10.32 LEFT LOWER QUADRANT PAIN: Status: ACTIVE | Noted: 2019-02-13

## 2019-02-13 NOTE — PROGRESS NOTES
I called Yulia Abebe on the phone today to see how she is doing.  She still has some crampy abdominal pain but no fevers.  It seems like the bleeding has stopped.  She is getting her CAT scan tomorrow.  I talked to her about her biopsies which showed changes suspicious for celiac disease.  Interestingly, her serologies were negative.  She is going to get on a gluten-free diet for now.  She will call us with worsening troubles.

## 2019-02-14 ENCOUNTER — HOSPITAL ENCOUNTER (OUTPATIENT)
Dept: CT IMAGING | Facility: HOSPITAL | Age: 64
Discharge: HOME | End: 2019-02-14
Admitting: INTERNAL MEDICINE

## 2019-02-14 DIAGNOSIS — R10.32 LEFT LOWER QUADRANT PAIN: ICD-10-CM

## 2019-02-14 PROCEDURE — 74176 CT ABD & PELVIS W/O CONTRAST: CPT

## 2019-02-14 RX ADMIN — BARIUM SULFATE 450 ML: 21 SUSPENSION ORAL at 15:15

## 2019-02-18 DIAGNOSIS — K31.89 RETAINED FOOD IN STOMACH: ICD-10-CM

## 2019-02-18 DIAGNOSIS — R10.84 GENERALIZED ABDOMINAL PAIN: Primary | ICD-10-CM

## 2019-03-12 ENCOUNTER — HOSPITAL ENCOUNTER (OUTPATIENT)
Dept: NUCLEAR MEDICINE | Facility: HOSPITAL | Age: 64
Discharge: HOME | End: 2019-03-12

## 2019-03-12 DIAGNOSIS — K31.89 RETAINED FOOD IN STOMACH: ICD-10-CM

## 2019-03-12 DIAGNOSIS — R10.84 GENERALIZED ABDOMINAL PAIN: ICD-10-CM

## 2019-03-12 PROCEDURE — 0 TECHNETIUM SULFUR COLLOID: Performed by: INTERNAL MEDICINE

## 2019-03-12 PROCEDURE — 78264 GASTRIC EMPTYING IMG STUDY: CPT

## 2019-03-12 PROCEDURE — A9541 TC99M SULFUR COLLOID: HCPCS | Performed by: INTERNAL MEDICINE

## 2019-03-12 RX ADMIN — TECHNETIUM TC 99M SULFUR COLLOID 1 DOSE: KIT at 08:57

## 2019-03-19 ENCOUNTER — OFFICE VISIT (OUTPATIENT)
Dept: CARDIOLOGY | Facility: CLINIC | Age: 64
End: 2019-03-19

## 2019-03-19 VITALS
DIASTOLIC BLOOD PRESSURE: 76 MMHG | SYSTOLIC BLOOD PRESSURE: 120 MMHG | BODY MASS INDEX: 26.22 KG/M2 | HEART RATE: 64 BPM | HEIGHT: 69 IN | WEIGHT: 177 LBS

## 2019-03-19 DIAGNOSIS — R10.13 EPIGASTRIC PAIN: ICD-10-CM

## 2019-03-19 DIAGNOSIS — F41.9 ANXIETY: ICD-10-CM

## 2019-03-19 DIAGNOSIS — E78.2 MIXED HYPERLIPIDEMIA: ICD-10-CM

## 2019-03-19 DIAGNOSIS — K90.0 CELIAC DISEASE: ICD-10-CM

## 2019-03-19 DIAGNOSIS — E66.3 OVERWEIGHT: ICD-10-CM

## 2019-03-19 DIAGNOSIS — R00.2 PALPITATIONS: ICD-10-CM

## 2019-03-19 DIAGNOSIS — I10 ESSENTIAL HYPERTENSION: Primary | ICD-10-CM

## 2019-03-19 DIAGNOSIS — K31.84 GASTROPARESIS: ICD-10-CM

## 2019-03-19 PROCEDURE — 99213 OFFICE O/P EST LOW 20 MIN: CPT | Performed by: NURSE PRACTITIONER

## 2019-03-19 NOTE — PROGRESS NOTES
Chief Complaint   Patient presents with   • Follow-up     For cardiac management. Patient is on aspirin. Reports that Dr. Mirza diagnosed with celiac disease. Reports that she does have shortness of breath at times. Reports sometimes she hurts under her left rib. Reports that she does have palpitations. Last lab work was done about 6 months ago per PCP, not in chart.    • Med Refill     Verbalized medication list.        Cardiac Complaints  dyspnea and palpitations      Subjective   Yulia Abebe is a 63 y.o. female with hypertension, hyperlipidemia, hypothyroidism, depression, and chest discomfort.  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  Labs have been followed with primary care. She went to ER in November with chest pain, nausea, and vomiting.  After she called to report to our office, cardiac workup was recommended.  Stress and echo were advised.  Stress showed no ischemia and good LV function, no valvular concerns were noted. She continues to struggle with anxiety and panic attacks. She comes today for follow up and reports recent diagnosis of celiac.  She states she if following with Dr. Mirza in regards.  She did have gastric emptying test which was abnormal and she had no emptying after 5 hours. She does report some abdominal pain which radiates into her chest and under the right rib.  She does have shortness of breath and palpitations but no worse than prior.  She does state she has been very anxious in regards to new diagnosis.  Labs are done with PCP and were done several months ago, but no copies are available.  No refills needed.        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 Cardiolite- Negative. EF 76%.   • CHOLECYSTECTOMY     • ECHO - CONVERTED  09/18/2010    Echo- ([facility]. Dr. Barr) EF 65%   • ECHO - CONVERTED  09/18/2010    D. Echo- ([facility]. Dr. Barr) Negative.   • ECHO - CONVERTED  03/14/2012    Echo- EF 65-70%   • ECHO - CONVERTED  11/15/2018    EF 55-60%. Mild MR.   • HYSTERECTOMY         Current Outpatient Medications   Medication Sig Dispense Refill   • aspirin 81 MG EC tablet Take 81 mg by mouth daily.     • atorvastatin (LIPITOR) 10 MG tablet Take 10 mg by mouth Daily.     • Cholecalciferol (VITAMIN D3) 2000 units tablet Take  by mouth Daily.     • clidinium-chlordiazepoxide (LIBRAX) 5-2.5 MG per capsule Take 1 capsule by mouth Every 6 (Six) Hours.     • estradiol (ESTRACE) 0.5 MG tablet Take 0.25 mg by mouth Daily.     • levothyroxine (SYNTHROID, LEVOTHROID) 112 MCG tablet Take 112 mcg by mouth Daily.     • Linaclotide (LINZESS) 145 MCG capsule Take 145 mcg by mouth As Needed.     • nitroglycerin (NITROSTAT) 0.4 MG SL tablet Place 0.4 mg under the tongue Every 5 (Five) Minutes As Needed for chest pain. Take no more than 3 doses in 15 minutes.     • omeprazole (priLOSEC) 40 MG capsule Take 40 mg by mouth 2 (Two) Times a Day.     • PARoxetine (PAXIL) 40 MG tablet Take 40 mg by mouth Every Night.     • promethazine (PHENERGAN) 25 MG tablet Take 25 mg by mouth every 4 (four) hours as needed for nausea or vomiting.     • sucralfate (CARAFATE) 1 G tablet Take 1 g by mouth 4 (four) times a day.       No current facility-administered medications for this visit.        Sulfa antibiotics and Penicillins    Past Medical History:   Diagnosis Date   • Abnormal US (ultrasound) of abdomen 11/22/2010    US abdomen. No gallstones. 1.8cm benign cyst right kidney   • Abnormal US (ultrasound) of abdomen 11/22/2010    No  gallstones. 1.8cm benign cyst right kidney.   • Anxiety and depression    • Celiac disease    • H/O: hysterectomy    • History of appendectomy    • History of basal cell cancer     removed   • Hyperlipidemia    • Hyperthyroidism    • Optional surgery     Adhesions removed as a child       Social History     Socioeconomic History   • Marital status:      Spouse name: Not on file   • Number of children: Not on file   • Years of education: Not on file   • Highest education level: Not on file   Social Needs   • Financial resource strain: Not on file   • Food insecurity - worry: Not on file   • Food insecurity - inability: Not on file   • Transportation needs - medical: Not on file   • Transportation needs - non-medical: Not on file   Occupational History   • Not on file   Tobacco Use   • Smoking status: Never Smoker   • Smokeless tobacco: Never Used   Substance and Sexual Activity   • Alcohol use: No   • Drug use: No   • Sexual activity: Not on file   Other Topics Concern   • Not on file   Social History Narrative   • Not on file       Family History   Problem Relation Age of Onset   • Heart disease Father    • Stroke Father    • Hypertension Father    • Heart disease Other    • Stroke Other        Review of Systems   Constitution: Negative for weakness and malaise/fatigue.   Cardiovascular: Negative for chest pain, claudication, dyspnea on exertion, irregular heartbeat, near-syncope, orthopnea, palpitations and syncope.   Respiratory: Negative for cough, shortness of breath and wheezing.    Musculoskeletal: Negative for back pain, joint pain and joint swelling.   Gastrointestinal: Negative for anorexia, heartburn, nausea and vomiting.   Genitourinary: Negative for dysuria, hematuria, hesitancy and nocturia.   Neurological: Negative for dizziness, light-headedness and loss of balance.   Psychiatric/Behavioral: Negative for depression and memory loss. The patient is not nervous/anxious.            Objective     BP  "120/76 (BP Location: Left arm)   Pulse 64   Ht 175.3 cm (69.02\")   Wt 80.3 kg (177 lb)   BMI 26.13 kg/m²     Physical Exam   Constitutional: She is oriented to person, place, and time. She appears well-developed and well-nourished.   HENT:   Head: Normocephalic and atraumatic.   Eyes: EOM are normal. Pupils are equal, round, and reactive to light.   Neck: Normal range of motion. Neck supple.   Cardiovascular: Normal rate and regular rhythm.   Murmur heard.  Pulmonary/Chest: Effort normal and breath sounds normal.   Abdominal: Soft.   Musculoskeletal: Normal range of motion.   Neurological: She is alert and oriented to person, place, and time.   Skin: Skin is warm and dry.   Psychiatric: She has a normal mood and affect. Her behavior is normal.       Procedures    Assessment/Plan     HR and BP are stable today. HTN well managed on current regimen, same advised.  She does continue to have palpitations but denies any worse than before, no changes recommended except to continue and limit her caffeine intake. She follows with your office for lab management, including FLP for hyperlipidemia and statin dosing.  Could we have most recent for review?  She has now been diagnosed with celiac recently and has slow gastric emptying for which she is following with GI.  Anxiety continues to be of concern and she sees psychology in regards.  BMI elevated at 26.13 but she has lost weight as she has been following gluten free diet, she was advised to continue.  6 month follow up recommended or sooner if needed.         Problems Addressed this Visit        Cardiovascular and Mediastinum    Essential hypertension - Primary    Mixed hyperlipidemia       Nervous and Auditory    Epigastric pain       Other    Anxiety      Other Visit Diagnoses     Celiac disease        Gastroparesis        Overweight              Patient's Body mass index is 26.13 kg/m². BMI is above normal parameters. Recommendations include: nutrition " counseling.            Electronically signed by SRIKANTH Plata March 19, 2019 4:54 PM

## 2019-03-20 ENCOUNTER — OFFICE VISIT (OUTPATIENT)
Dept: GASTROENTEROLOGY | Facility: CLINIC | Age: 64
End: 2019-03-20

## 2019-03-20 VITALS
DIASTOLIC BLOOD PRESSURE: 92 MMHG | WEIGHT: 176 LBS | HEART RATE: 73 BPM | BODY MASS INDEX: 25.98 KG/M2 | SYSTOLIC BLOOD PRESSURE: 132 MMHG

## 2019-03-20 DIAGNOSIS — K90.0 CELIAC DISEASE: Primary | ICD-10-CM

## 2019-03-20 PROCEDURE — 99213 OFFICE O/P EST LOW 20 MIN: CPT | Performed by: INTERNAL MEDICINE

## 2019-03-20 NOTE — PROGRESS NOTES
PCP:  Oscar Huffman MD     No referring provider defined for this encounter.    Chief Complaint   Patient presents with   • Follow-up     abdominal pain        HPI   The patient is known to me has a history of mild gastroparesis.  This was noted on gastric emptying study.  She also had a CAT scan of the abdomen which showed a contrast of stomach.  She did have a small amount of scarring at the lung base.  Overall she seems to be doing better.  She has cut out gluten.  She did have biopsies which showed what appeared to be celiac disease.  Her antibodies were negative.  She has lost 12 pounds.    Allergies   Allergen Reactions   • Sulfa Antibiotics Swelling   • Penicillins Rash          Current Outpatient Medications:   •  aspirin 81 MG EC tablet, Take 81 mg by mouth daily., Disp: , Rfl:   •  atorvastatin (LIPITOR) 10 MG tablet, Take 10 mg by mouth Daily., Disp: , Rfl:   •  Cholecalciferol (VITAMIN D3) 2000 units tablet, Take  by mouth Daily., Disp: , Rfl:   •  clidinium-chlordiazepoxide (LIBRAX) 5-2.5 MG per capsule, Take 1 capsule by mouth Every 6 (Six) Hours., Disp: , Rfl:   •  estradiol (ESTRACE) 0.5 MG tablet, Take 0.25 mg by mouth Daily., Disp: , Rfl:   •  levothyroxine (SYNTHROID, LEVOTHROID) 112 MCG tablet, Take 112 mcg by mouth Daily., Disp: , Rfl:   •  Linaclotide (LINZESS) 145 MCG capsule, Take 145 mcg by mouth As Needed., Disp: , Rfl:   •  nitroglycerin (NITROSTAT) 0.4 MG SL tablet, Place 0.4 mg under the tongue Every 5 (Five) Minutes As Needed for chest pain. Take no more than 3 doses in 15 minutes., Disp: , Rfl:   •  omeprazole (priLOSEC) 40 MG capsule, Take 40 mg by mouth 2 (Two) Times a Day., Disp: , Rfl:   •  PARoxetine (PAXIL) 40 MG tablet, Take 40 mg by mouth Every Night., Disp: , Rfl:   •  promethazine (PHENERGAN) 25 MG tablet, Take 25 mg by mouth every 4 (four) hours as needed for nausea or vomiting., Disp: , Rfl:   •  sucralfate (CARAFATE) 1 G tablet, Take 1 g by mouth 4 (four) times a day.,  Disp: , Rfl:      Past Medical History:   Diagnosis Date   • Abnormal US (ultrasound) of abdomen 11/22/2010    US abdomen. No gallstones. 1.8cm benign cyst right kidney   • Abnormal US (ultrasound) of abdomen 11/22/2010    No gallstones. 1.8cm benign cyst right kidney.   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 Cardiolite- Negative. EF 76%.   • CHOLECYSTECTOMY     • ECHO - CONVERTED  09/18/2010    Echo- ([facility]. Dr. Barr) EF 65%   • ECHO - CONVERTED  09/18/2010    D. Echo- ([facility]. Dr. Barr) Negative.   • ECHO - CONVERTED  03/14/2012    Echo- EF 65-70%   • ECHO - CONVERTED  11/15/2018    EF 55-60%. Mild MR.   • HYSTERECTOMY          Social History     Socioeconomic History   • Marital status:      Spouse name: Not on file   • Number of children: Not on file   • Years of education: Not on file   • Highest education level: Not on file   Tobacco Use   • Smoking status: Never Smoker   • Smokeless tobacco: Never Used   Substance and Sexual Activity   • Alcohol use: No   • Drug use: No   • Sexual activity: Defer        Family History   Problem Relation Age of Onset   • Heart disease Father    • Stroke Father    • Hypertension Father    • Heart disease Other    • Stroke Other    • Colon polyps Brother         Review of Systems   Constitutional: Negative for unexpected weight loss.   HENT: Negative for  trouble swallowing.    Eyes: Negative.    Respiratory: Negative.    Gastrointestinal: Negative for abdominal distention, abdominal pain, anal bleeding, blood in stool, constipation, diarrhea, nausea, rectal pain, vomiting, GERD and indigestion.   Endocrine: Negative.    Genitourinary: Negative.    Musculoskeletal: Negative.    Skin: Negative.    Allergic/Immunologic: Negative.    Neurological: Negative.    Hematological: Negative.    Psychiatric/Behavioral: Negative.         Vitals:    03/20/19 1424   BP: 132/92   Pulse: 73        Physical Exam   General Appearance: Alert, in no acute distress   Head: Normocephalic, without obvious abnormality, atraumatic   Eyes: Lids and lashes normal, conjunctivae and sclerae normal, no icterus, no pallor, corneas clear, PERRLA   Extremities: Moves all extremities well, no edema, no cyanosis, no redness   Skin: No bleeding, bruising or rash   Neurologic: Cranial nerves 2 - 12 grossly intact, no focal deficits       Yulia was seen today for follow-up.    Diagnoses and all orders for this visit:    Celiac disease  -     Celiac Disease HLA DQ Assoc.    Impressions and plan #1 possible celiac disease: Her biopsies were positive for celiac disease which is the gold standard.  Her antibodies were negative.  I would like to check an HLA test to see if she genetically is susceptible to celiac disease.  This will also be helpful if it is  negative.  Because is a lifelong diagnosis, I think it would be important to confirm.  We will see her back in follow-up.    Ernesto Mirza MD

## 2019-03-21 PROBLEM — K90.0 CELIAC DISEASE: Status: ACTIVE | Noted: 2019-03-21

## 2019-03-27 DIAGNOSIS — K90.0 CELIAC DISEASE: Primary | ICD-10-CM

## 2019-04-03 RX ORDER — SODIUM, POTASSIUM,MAG SULFATES 17.5-3.13G
2 SOLUTION, RECONSTITUTED, ORAL ORAL TAKE AS DIRECTED
Qty: 354 ML | Refills: 0 | Status: SHIPPED | OUTPATIENT
Start: 2019-04-03 | End: 2019-09-18 | Stop reason: ALTCHOICE

## 2019-04-08 ENCOUNTER — HOSPITAL ENCOUNTER (OUTPATIENT)
Dept: NUTRITION | Facility: HOSPITAL | Age: 64
Setting detail: RECURRING SERIES
Discharge: HOME | End: 2019-04-08

## 2019-04-08 VITALS — BODY MASS INDEX: 25.18 KG/M2 | WEIGHT: 170 LBS | HEIGHT: 69 IN

## 2019-04-08 PROCEDURE — 97802 MEDICAL NUTRITION INDIV IN: CPT | Performed by: DIETITIAN, REGISTERED

## 2019-04-09 ENCOUNTER — OUTSIDE FACILITY SERVICE (OUTPATIENT)
Dept: GASTROENTEROLOGY | Facility: CLINIC | Age: 64
End: 2019-04-09

## 2019-04-09 PROCEDURE — 45378 DIAGNOSTIC COLONOSCOPY: CPT | Performed by: INTERNAL MEDICINE

## 2019-05-01 ENCOUNTER — TELEPHONE (OUTPATIENT)
Dept: NUTRITION | Facility: HOSPITAL | Age: 64
End: 2019-05-01

## 2019-05-01 NOTE — PROGRESS NOTES
"Spoke with patient for 1 month telephone follow up regarding celiac disease and gluten free diet. Patient states \"so far it's been going good\". Describes success with trying various gluten free grains and finding options for eating out (gives example - at [employer] she has been doing plain chicken breast with side salad). Reports very few episodes of abdominal pain and has been aware of cross contamination. Self reported weight is 77.1 kg (170 lbs) - weight maintenance. She is pleased her rapid weight loss after diagnosis has subsided, and attributes the weight maintenance to adding in more GF options since our session. Patient has no questions or concerns. Scheduled free follow up for 6/3 at 2:00 p.m.     Goal completion:  1. Follow GF diet: 100%  2. Maintain or lose weight 0.9kg/month: 100%    Total of 15 minutes spent for telephone follow up. Patient encouraged to call RD as needed. Thank you again for this referral.   "

## 2019-06-03 ENCOUNTER — OFFICE VISIT (OUTPATIENT)
Dept: GASTROENTEROLOGY | Facility: CLINIC | Age: 64
End: 2019-06-03

## 2019-06-03 ENCOUNTER — HOSPITAL ENCOUNTER (OUTPATIENT)
Dept: NUTRITION | Facility: HOSPITAL | Age: 64
Setting detail: RECURRING SERIES
Discharge: HOME | End: 2019-06-03

## 2019-06-03 VITALS
DIASTOLIC BLOOD PRESSURE: 76 MMHG | HEART RATE: 74 BPM | SYSTOLIC BLOOD PRESSURE: 110 MMHG | WEIGHT: 176 LBS | BODY MASS INDEX: 25.99 KG/M2

## 2019-06-03 VITALS — BODY MASS INDEX: 25.18 KG/M2 | WEIGHT: 170 LBS | HEIGHT: 69 IN

## 2019-06-03 DIAGNOSIS — K90.0 CELIAC DISEASE: Primary | ICD-10-CM

## 2019-06-03 DIAGNOSIS — K21.9 GASTROESOPHAGEAL REFLUX DISEASE WITHOUT ESOPHAGITIS: ICD-10-CM

## 2019-06-03 DIAGNOSIS — K59.00 CONSTIPATION, UNSPECIFIED CONSTIPATION TYPE: ICD-10-CM

## 2019-06-03 PROCEDURE — 99213 OFFICE O/P EST LOW 20 MIN: CPT | Performed by: INTERNAL MEDICINE

## 2019-06-03 NOTE — PROGRESS NOTES
PCP:  Oscar Huffman MD     No referring provider defined for this encounter.    Chief Complaint   Patient presents with   • Follow-up     follow up abdominal pain        HPI   The patient is known to me.  She has a history of celiac disease.  She has become stricter on her diet and overall she seems to be doing much better.  Recent colonoscopy was normal other than some fixation in the sigmoid and internal hemorrhoids.  This was done on April 9, 2019.    Allergies   Allergen Reactions   • Sulfa Antibiotics Swelling   • Penicillins Rash          Current Outpatient Medications:   •  aspirin 81 MG EC tablet, Take 81 mg by mouth daily., Disp: , Rfl:   •  atorvastatin (LIPITOR) 10 MG tablet, Take 10 mg by mouth Daily., Disp: , Rfl:   •  Cholecalciferol (VITAMIN D3) 2000 units tablet, Take  by mouth Daily., Disp: , Rfl:   •  clidinium-chlordiazepoxide (LIBRAX) 5-2.5 MG per capsule, Take 1 capsule by mouth Every 6 (Six) Hours., Disp: , Rfl:   •  estradiol (ESTRACE) 0.5 MG tablet, Take 0.25 mg by mouth Daily., Disp: , Rfl:   •  levothyroxine (SYNTHROID, LEVOTHROID) 112 MCG tablet, Take 112 mcg by mouth Daily., Disp: , Rfl:   •  nitroglycerin (NITROSTAT) 0.4 MG SL tablet, Place 0.4 mg under the tongue Every 5 (Five) Minutes As Needed for chest pain. Take no more than 3 doses in 15 minutes., Disp: , Rfl:   •  omeprazole (priLOSEC) 40 MG capsule, Take 40 mg by mouth 2 (Two) Times a Day., Disp: , Rfl:   •  PARoxetine (PAXIL) 40 MG tablet, Take 40 mg by mouth Every Night., Disp: , Rfl:   •  promethazine (PHENERGAN) 25 MG tablet, Take 25 mg by mouth every 4 (four) hours as needed for nausea or vomiting., Disp: , Rfl:   •  sodium-potassium-magnesium sulfates (SUPREP BOWEL PREP KIT) 17.5-3.13-1.6 GM/177ML solution oral solution, Take 2 bottles by mouth Take As Directed. Do not eat the day before your procedure. If you didn't receive instructions call [phone]., Disp: 354 mL, Rfl: 0  •  sucralfate (CARAFATE) 1 G tablet,  Take 1 g by mouth 4 (four) times a day., Disp: , Rfl:      Past Medical History:   Diagnosis Date   • Abnormal US (ultrasound) of abdomen 11/22/2010    US abdomen. No gallstones. 1.8cm benign cyst right kidney   • Abnormal US (ultrasound) of abdomen 11/22/2010    No gallstones. 1.8cm benign cyst right kidney.   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 Cardiolite- Negative. EF 76%.   • CHOLECYSTECTOMY     • ECHO - CONVERTED  09/18/2010    Echo- ([facility]. Dr. Barr) EF 65%   • ECHO - CONVERTED  09/18/2010    D. Echo- ([facility]. Dr. Barr) Negative.   • ECHO - CONVERTED  03/14/2012    Echo- EF 65-70%   • ECHO - CONVERTED  11/15/2018    EF 55-60%. Mild MR.   • HYSTERECTOMY          Social History     Socioeconomic History   • Marital status:      Spouse name: Not on file   • Number of children: Not on file   • Years of education: Not on file   • Highest education level: Not on file   Tobacco Use   • Smoking status: Never Smoker   • Smokeless tobacco: Never Used   Substance and Sexual Activity   • Alcohol use: No   • Drug use: No   • Sexual activity: Defer        Family History   Problem Relation Age of Onset   • Heart disease Father    • Stroke Father    • Hypertension Father    • Heart disease Other    • Stroke Other    • Colon polyps Brother         Review of Systems     Vitals:    06/03/19 1535   BP:  110/76   Pulse: 74        Physical Exam   General Appearance: Alert, in no acute distress   Head: Normocephalic, without obvious abnormality, atraumatic   Eyes: Lids and lashes normal, conjunctivae and sclerae normal, no icterus, no pallor, corneas clear, PERRLA   Extremities: Moves all extremities well, no edema, no cyanosis, no redness   Skin: No bleeding, bruising or rash   Neurologic: Cranial nerves 2 - 12 grossly intact, no focal deficits     Review of systems was reviewed    Yulia was seen today for follow-up.    Diagnoses and all orders for this visit:    Celiac disease    Gastroesophageal reflux disease without esophagitis    Other orders  -     linaclotide (LINZESS) 72 MCG capsule capsule; Take 1 capsule by mouth Every Morning Before Breakfast.      Impressions and plan #1 celiac disease: She is going to continue a gluten-free diet at this point and seems to be doing better.    #2 gastroesophageal reflux disease: She will continue her present medication and seems to be doing fairly well from that standpoint.    #3 constipation: We gave her samples of Linzess 72 mcg/day which she takes intermittently.  This seems to be helping her from that point of view.  We will see her back in 4 months.         Ernesto Mirza MD

## 2019-06-05 PROBLEM — K59.00 CONSTIPATION: Status: ACTIVE | Noted: 2019-06-05

## 2019-09-09 ENCOUNTER — TELEPHONE (OUTPATIENT)
Dept: CARDIOLOGY | Facility: CLINIC | Age: 64
End: 2019-09-09

## 2019-09-09 DIAGNOSIS — R06.02 SHORTNESS OF BREATH: ICD-10-CM

## 2019-09-09 DIAGNOSIS — I20.8 ANGINAL EQUIVALENT (HCC): ICD-10-CM

## 2019-09-09 DIAGNOSIS — I20.8 ATYPICAL ANGINA (HCC): Primary | ICD-10-CM

## 2019-09-09 NOTE — TELEPHONE ENCOUNTER
Patient called and left voicemail that she went to [facility] ER yesterday with chest pain. She stated in voicemail that they told her to follow up with our office this week. I did pull ER records. Patient has an appointment with Nancy on 09/19/19.     Patient was sent to ER from urgent care. States that hospital said that tropinin was border line elevated and gave patient the option of staying for further testing or going home and patient chose to go home.     States that she has had some twinges of chest pain today. Denies any shortness of breath today. States that she did have some palpitations this morning.      What are your recommendations?

## 2019-09-09 NOTE — TELEPHONE ENCOUNTER
Patient was made aware of order for stress and echo. Patient does have nitroglycerin to use if she needs. Patient was made aware to keep appointment on 09/19/19 to go over results of stress and echo.

## 2019-09-09 NOTE — TELEPHONE ENCOUNTER
I will order a stress and echo I think she can wait to the 19th to discuss results.  Does she have NTG to use at home?  If not, please send NTG SL 0.4mg as needed x3, q 5 minutes. Order for stress and echo will be in.

## 2019-09-17 ENCOUNTER — HOSPITAL ENCOUNTER (OUTPATIENT)
Dept: CARDIOLOGY | Facility: HOSPITAL | Age: 64
Discharge: HOME | End: 2019-09-17

## 2019-09-17 VITALS — HEIGHT: 69 IN | WEIGHT: 175.93 LBS | BODY MASS INDEX: 26.06 KG/M2

## 2019-09-17 DIAGNOSIS — I20.8 ATYPICAL ANGINA (HCC): ICD-10-CM

## 2019-09-17 DIAGNOSIS — R06.02 SHORTNESS OF BREATH: ICD-10-CM

## 2019-09-17 DIAGNOSIS — I20.8 ANGINAL EQUIVALENT (HCC): ICD-10-CM

## 2019-09-17 LAB
BH CV ECHO MEAS - ACS: 2.1 CM
BH CV ECHO MEAS - AO MAX PG (FULL): 2 MMHG
BH CV ECHO MEAS - AO MAX PG: 6 MMHG
BH CV ECHO MEAS - AO MEAN PG (FULL): 1.2 MMHG
BH CV ECHO MEAS - AO MEAN PG: 2.7 MMHG
BH CV ECHO MEAS - AO ROOT AREA (BSA CORRECTED): 1.4
BH CV ECHO MEAS - AO ROOT AREA: 5.8 CM^2
BH CV ECHO MEAS - AO ROOT DIAM: 2.7 CM
BH CV ECHO MEAS - AO V2 MAX: 122.5 CM/SEC
BH CV ECHO MEAS - AO V2 MEAN: 76.1 CM/SEC
BH CV ECHO MEAS - AO V2 VTI: 25 CM
BH CV ECHO MEAS - BSA(HAYCOCK): 2 M^2
BH CV ECHO MEAS - BSA: 2 M^2
BH CV ECHO MEAS - BZI_BMI: 26 KILOGRAMS/M^2
BH CV ECHO MEAS - BZI_METRIC_HEIGHT: 175.3 CM
BH CV ECHO MEAS - BZI_METRIC_WEIGHT: 79.8 KG
BH CV ECHO MEAS - CI(CUBED): 6.6 L/MIN/M^2
BH CV ECHO MEAS - CI(TEICH): 5.6 L/MIN/M^2
BH CV ECHO MEAS - CO(CUBED): 12.9 L/MIN
BH CV ECHO MEAS - CO(TEICH): 10.9 L/MIN
BH CV ECHO MEAS - EDV(CUBED): 114.6 ML
BH CV ECHO MEAS - EDV(TEICH): 110.5 ML
BH CV ECHO MEAS - EF(CUBED): 67.6 %
BH CV ECHO MEAS - EF(TEICH): 59 %
BH CV ECHO MEAS - ESV(CUBED): 37.2 ML
BH CV ECHO MEAS - ESV(TEICH): 45.3 ML
BH CV ECHO MEAS - FS: 31.3 %
BH CV ECHO MEAS - IVS/LVPW: 0.94
BH CV ECHO MEAS - IVSD: 0.83 CM
BH CV ECHO MEAS - LA DIMENSION(2D): 3.4 CM
BH CV ECHO MEAS - LA DIMENSION: 3.6 CM
BH CV ECHO MEAS - LA/AO: 1.3
BH CV ECHO MEAS - LAT PEAK E' VEL: 9 CM/SEC
BH CV ECHO MEAS - LV IVRT: 0.1 SEC
BH CV ECHO MEAS - LV MASS(C)D: 141.1 GRAMS
BH CV ECHO MEAS - LV MASS(C)DI: 72.1 GRAMS/M^2
BH CV ECHO MEAS - LV MAX PG: 4.1 MMHG
BH CV ECHO MEAS - LV MEAN PG: 1.5 MMHG
BH CV ECHO MEAS - LV V1 MAX: 100.7 CM/SEC
BH CV ECHO MEAS - LV V1 MEAN: 56.3 CM/SEC
BH CV ECHO MEAS - LV V1 VTI: 20.3 CM
BH CV ECHO MEAS - LVIDD: 4.9 CM
BH CV ECHO MEAS - LVIDS: 3.3 CM
BH CV ECHO MEAS - LVPWD: 0.88 CM
BH CV ECHO MEAS - MED PEAK E' VEL: 9 CM/SEC
BH CV ECHO MEAS - MITRAL HR: 129.4 BPM
BH CV ECHO MEAS - MITRAL R-R: 0.46 SEC
BH CV ECHO MEAS - MM HR: 166.7 BPM
BH CV ECHO MEAS - MM R-R INT: 0.36 SEC
BH CV ECHO MEAS - MV A MAX VEL: 49 CM/SEC
BH CV ECHO MEAS - MV DEC SLOPE: 354.1 CM/SEC^2
BH CV ECHO MEAS - MV DEC TIME: 0.18 SEC
BH CV ECHO MEAS - MV E MAX VEL: 63 CM/SEC
BH CV ECHO MEAS - MV E/A: 1.3
BH CV ECHO MEAS - MV MAX PG: 2.5 MMHG
BH CV ECHO MEAS - MV MEAN PG: 1.5 MMHG
BH CV ECHO MEAS - MV V2 MAX: 79.6 CM/SEC
BH CV ECHO MEAS - MV V2 MEAN: 57.2 CM/SEC
BH CV ECHO MEAS - MV V2 VTI: 26.3 CM
BH CV ECHO MEAS - PA MAX PG (FULL): -0.06 MMHG
BH CV ECHO MEAS - PA MAX PG: 3 MMHG
BH CV ECHO MEAS - PA MEAN PG (FULL): 0.25 MMHG
BH CV ECHO MEAS - PA MEAN PG: 2 MMHG
BH CV ECHO MEAS - PA V2 MAX: 87 CM/SEC
BH CV ECHO MEAS - PA V2 MEAN: 67.2 CM/SEC
BH CV ECHO MEAS - PA V2 VTI: 24.9 CM
BH CV ECHO MEAS - PULM. HR: 162.1 BPM
BH CV ECHO MEAS - PULM. R-R: 0.37 SEC
BH CV ECHO MEAS - RAP SYSTOLE: 10 MMHG
BH CV ECHO MEAS - RV MAX PG: 3.1 MMHG
BH CV ECHO MEAS - RV MEAN PG: 1.7 MMHG
BH CV ECHO MEAS - RV V1 MAX: 87.8 CM/SEC
BH CV ECHO MEAS - RV V1 MEAN: 62.1 CM/SEC
BH CV ECHO MEAS - RV V1 VTI: 21.7 CM
BH CV ECHO MEAS - RVDD: 2.4 CM
BH CV ECHO MEAS - RVSP: 22 MMHG
BH CV ECHO MEAS - SI(AO): 74.7 ML/M^2
BH CV ECHO MEAS - SI(CUBED): 39.6 ML/M^2
BH CV ECHO MEAS - SI(TEICH): 33.3 ML/M^2
BH CV ECHO MEAS - SV(AO): 146.2 ML
BH CV ECHO MEAS - SV(CUBED): 77.4 ML
BH CV ECHO MEAS - SV(TEICH): 65.2 ML
BH CV ECHO MEAS - TR MAX VEL: 165.5 CM/SEC
BH CV ECHO MEASUREMENTS AVERAGE E/E' RATIO: 7
BH CV NUCLEAR PRIOR STUDY: 3
BH CV STRESS COMMENTS STAGE 1: NORMAL
BH CV STRESS DOSE REGADENOSON STAGE 1: 0.4
BH CV STRESS DURATION MIN STAGE 1: 0
BH CV STRESS DURATION SEC STAGE 1: 10
BH CV STRESS PROTOCOL 1: NORMAL
BH CV STRESS RECOVERY BP: NORMAL MMHG
BH CV STRESS RECOVERY HR: 80 BPM
BH CV STRESS STAGE 1: 1
LV EF NUC BP: 74 %
MAXIMAL PREDICTED HEART RATE: 157 BPM
MAXIMAL PREDICTED HEART RATE: 157 BPM
PERCENT MAX PREDICTED HR: 59.24 %
STRESS BASELINE BP: NORMAL MMHG
STRESS BASELINE HR: 65 BPM
STRESS PERCENT HR: 70 %
STRESS POST PEAK BP: NORMAL MMHG
STRESS POST PEAK HR: 93 BPM
STRESS TARGET HR: 133 BPM
STRESS TARGET HR: 133 BPM

## 2019-09-17 PROCEDURE — 78452 HT MUSCLE IMAGE SPECT MULT: CPT

## 2019-09-17 PROCEDURE — 93017 CV STRESS TEST TRACING ONLY: CPT

## 2019-09-17 PROCEDURE — 93018 CV STRESS TEST I&R ONLY: CPT | Performed by: INTERNAL MEDICINE

## 2019-09-17 PROCEDURE — 78452 HT MUSCLE IMAGE SPECT MULT: CPT | Performed by: INTERNAL MEDICINE

## 2019-09-17 PROCEDURE — 0 TECHNETIUM SESTAMIBI: Performed by: INTERNAL MEDICINE

## 2019-09-17 PROCEDURE — A9500 TC99M SESTAMIBI: HCPCS | Performed by: INTERNAL MEDICINE

## 2019-09-17 PROCEDURE — 25010000002 REGADENOSON 0.4 MG/5ML SOLUTION: Performed by: INTERNAL MEDICINE

## 2019-09-17 PROCEDURE — 93306 TTE W/DOPPLER COMPLETE: CPT | Performed by: INTERNAL MEDICINE

## 2019-09-17 PROCEDURE — 93306 TTE W/DOPPLER COMPLETE: CPT

## 2019-09-17 RX ADMIN — REGADENOSON 0.4 MG: 0.08 INJECTION, SOLUTION INTRAVENOUS at 11:00

## 2019-09-17 RX ADMIN — TECHNETIUM TC 99M SESTAMIBI 1 DOSE: 1 INJECTION INTRAVENOUS at 11:00

## 2019-09-17 RX ADMIN — TECHNETIUM TC 99M SESTAMIBI 1 DOSE: 1 INJECTION INTRAVENOUS at 09:42

## 2019-09-18 ENCOUNTER — OFFICE VISIT (OUTPATIENT)
Dept: CARDIOLOGY | Facility: CLINIC | Age: 64
End: 2019-09-18

## 2019-09-18 VITALS
DIASTOLIC BLOOD PRESSURE: 70 MMHG | WEIGHT: 172.8 LBS | SYSTOLIC BLOOD PRESSURE: 110 MMHG | BODY MASS INDEX: 25.59 KG/M2 | HEART RATE: 70 BPM | HEIGHT: 69 IN

## 2019-09-18 DIAGNOSIS — E78.2 MIXED HYPERLIPIDEMIA: ICD-10-CM

## 2019-09-18 DIAGNOSIS — I10 ESSENTIAL HYPERTENSION: Primary | ICD-10-CM

## 2019-09-18 DIAGNOSIS — K90.0 CELIAC DISEASE: ICD-10-CM

## 2019-09-18 DIAGNOSIS — R49.0 HOARSENESS OF VOICE: ICD-10-CM

## 2019-09-18 DIAGNOSIS — F41.9 ANXIETY: ICD-10-CM

## 2019-09-18 PROCEDURE — 99213 OFFICE O/P EST LOW 20 MIN: CPT | Performed by: NURSE PRACTITIONER

## 2019-09-18 NOTE — PROGRESS NOTES
"Chief Complaint   Patient presents with   • Follow-up     Cardiac management . takes daily Aspirin. Had Stress and Echo done yesterday , results on chart.    • Palpitations     Has brief  \"fluttering\" episodes  that goes away with rest. Is having more stress at this time.. Has taken Nitroglycerin x's 3 on Sunday for chest pain . Had ER visit 9-8-19 with chest pain   • Med Refill     No refills needed today. PCP manges refills.       Subjective       Yulia Abebe is a 64 y.o. female with hypertension, hyperlipidemia, hypothyroidism, depression, and chest discomfort.  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  Labs have been followed with primary care. She went to ER in November 2018 with chest pain, nausea, and vomiting.  After she called to report to our office, cardiac workup was recommended. Stress showed no ischemia and good LV function, no valvular concerns were noted. She continues to struggle with anxiety and panic attacks. At March 2019 visit she reported recent diagnosis of celiac.  In early September 2019 she presented to urgent care with chest pain and was sent to the emergency department due to borderline troponin levels.  She called the office to report and repeat stress and echo advised.  On 9/17/2019 Lexiscan showed normal myocardial perfusion, no ischemia noted.  Echocardiogram showed normal LVEF being 66-70% and no significant valvular issues.  RVSP was 22 mmHg.    Today she comes to the office for follow-up visit.  Her main concern is cold type symptoms without fever.  She admits to quite a bit of stress and anxiety due to family issues.  She has not had to use Nitrostat since ER visit.    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 Cardiolite- Negative. EF 76%.   • CARDIOVASCULAR STRESS TEST  09/17/2019    L. Cardiolite- EF > 70%. Negative.   • CHOLECYSTECTOMY     • ECHO - CONVERTED  09/18/2010    Echo- ([facility]. Dr. Barr) EF 65%   • ECHO - CONVERTED  09/18/2010    D. Echo- ([facility]. Dr. Barr) Negative.   • ECHO - CONVERTED  03/14/2012    Echo- EF 65-70%   • ECHO - CONVERTED  11/15/2018    EF 55-60%. Mild MR.   • ECHO - CONVERTED  09/17/2019    EF 65%. Trace-Mild MR. RVSP- 22 mmHg.   • HYSTERECTOMY         Current Outpatient Medications   Medication Sig Dispense Refill   • aspirin 81 MG EC tablet Take 81 mg by mouth daily.     • atorvastatin (LIPITOR) 10 MG tablet Take 10 mg by mouth Daily.     • Cholecalciferol (VITAMIN D3) 2000 units tablet Take  by mouth Daily.     • clidinium-chlordiazepoxide (LIBRAX) 5-2.5 MG per capsule Take 1 capsule by mouth Every 6 (Six) Hours.     • estradiol (ESTRACE) 0.5 MG tablet Take 0.25 mg by mouth Daily.     • levothyroxine (SYNTHROID, LEVOTHROID) 112 MCG tablet Take 112 mcg by mouth Daily.     • linaclotide (LINZESS) 72 MCG capsule capsule Take 1 capsule by mouth Every Morning Before Breakfast. 30 capsule 11   • nitroglycerin (NITROSTAT) 0.4 MG SL tablet Place 0.4 mg under the tongue Every 5 (Five) Minutes As Needed for chest pain. Take no more than 3 doses in 15 minutes.     • omeprazole (priLOSEC) 40 MG capsule Take 40 mg by mouth 2 (Two) Times a Day.     • PARoxetine (PAXIL) 40 MG tablet Take 40 mg by mouth Every Night.     • promethazine (PHENERGAN) 25 MG tablet Take 25 mg by mouth every 4 (four) hours as needed for nausea or vomiting.     • sucralfate (CARAFATE) 1 G tablet Take 1 g by mouth 4 (four) times a day.       No current facility-administered medications for this visit.   "      Sulfa antibiotics and Penicillins    Past Medical History:   Diagnosis Date   • Abnormal US (ultrasound) of abdomen 11/22/2010    US abdomen. No gallstones. 1.8cm benign cyst right kidney   • Abnormal US (ultrasound) of abdomen 11/22/2010    No gallstones. 1.8cm benign cyst right kidney.   • Anxiety and depression    • Celiac disease    • H/O: hysterectomy    • History of appendectomy    • History of basal cell cancer     removed   • Hyperlipidemia    • Hyperthyroidism    • Optional surgery     Adhesions removed as a child       Social History     Socioeconomic History   • Marital status:      Spouse name: Not on file   • Number of children: Not on file   • Years of education: Not on file   • Highest education level: Not on file   Tobacco Use   • Smoking status: Never Smoker   • Smokeless tobacco: Never Used   Substance and Sexual Activity   • Alcohol use: No   • Drug use: No   • Sexual activity: Defer       Family History   Problem Relation Age of Onset   • Heart disease Father    • Stroke Father    • Hypertension Father    • Heart disease Other    • Stroke Other    • Colon polyps Brother        Review of Systems   Constitution: Positive for malaise/fatigue. Negative for decreased appetite, diaphoresis and fever.   HENT: Positive for hoarse voice and sore throat (\"scratchy\"). Negative for congestion, ear pain and nosebleeds.    Eyes: Negative for redness and visual disturbance.   Cardiovascular: Negative for chest pain, leg swelling and near-syncope.   Respiratory: Positive for cough and shortness of breath. Negative for sputum production.    Endocrine: Negative for polydipsia, polyphagia and polyuria.   Hematologic/Lymphatic: Negative for bleeding problem. Does not bruise/bleed easily.   Skin: Negative for dry skin, flushing and itching.   Musculoskeletal: Negative for falls, muscle cramps and muscle weakness.   Gastrointestinal: Positive for bloating (improved after starting gluten free diet). " "Negative for melena.   Genitourinary: Negative for dysuria and hematuria.   Neurological: Positive for headaches. Negative for excessive daytime sleepiness and loss of balance.   Psychiatric/Behavioral: Positive for depression. Negative for suicidal ideas. The patient is nervous/anxious.         Follows with Dr. Bazan   Allergic/Immunologic: Positive for environmental allergies. Negative for hives.        Objective     /70 (BP Location: Left arm)   Pulse 70   Ht 175 cm (68.9\")   Wt 78.4 kg (172 lb 12.8 oz)   BMI 25.59 kg/m²     Physical Exam   Constitutional: She is oriented to person, place, and time. Vital signs are normal. She appears well-nourished. No distress.   HENT:   Head: Normocephalic.   Eyes: Conjunctivae are normal. Pupils are equal, round, and reactive to light.   Neck: Normal range of motion. Neck supple. Carotid bruit is not present.   Cardiovascular: Normal rate, regular rhythm, S1 normal and S2 normal.   No murmur heard.  Pulmonary/Chest: Effort normal and breath sounds normal. She has no wheezes. She has no rales.   Abdominal: Soft. Bowel sounds are normal. There is no tenderness.   Musculoskeletal: Normal range of motion. She exhibits no edema.   Neurological: She is alert and oriented to person, place, and time.   Skin: Skin is warm and dry.   Psychiatric: She has a normal mood and affect. Her behavior is normal.        Procedures: none today         Assessment/Plan      Yulia was seen today for follow-up, palpitations and med refill.    Diagnoses and all orders for this visit:    Essential hypertension    Mixed hyperlipidemia    Celiac disease    Anxiety    Hoarseness of voice      The report of her recent stress test and echocardiogram were reviewed which showed good LV function and no ischemia.  Her blood pressure, heart rate, and heart rhythm today are normal.  She is on daily low-dose aspirin for primary prevention of CAD, continue same.    For lipid management she is on Lipitor " without issue.  She follows with you for lab orders.  Please forward a copy of lab results as available.    Patient's Body mass index is 25.59 kg/m². BMI is slightly above normal parameters. Recommendations include: nutrition counseling. She follows gluten free diet. I encouraged her on routine exercise such as daily walks.      She admits to significant family stress and anxiety.  She reports having a follow-up visit with Dr. Bazan in the near future.  I advised her to call for sooner appointment if possible.    She does have hoarseness of voice and cold type symptoms which she plans to go to urgent care.  Currently no fever, body aches or chills.    A 6-month follow-up visit scheduled.  Please call sooner for any cardiac concerns.             Electronically signed by SRIKANTH Palencia,  September 18, 2019 11:31 AM

## 2019-10-03 ENCOUNTER — OFFICE VISIT (OUTPATIENT)
Dept: GASTROENTEROLOGY | Facility: CLINIC | Age: 64
End: 2019-10-03

## 2019-10-03 VITALS
DIASTOLIC BLOOD PRESSURE: 65 MMHG | SYSTOLIC BLOOD PRESSURE: 117 MMHG | WEIGHT: 178 LBS | HEART RATE: 69 BPM | BODY MASS INDEX: 26.36 KG/M2

## 2019-10-03 DIAGNOSIS — K21.9 GASTROESOPHAGEAL REFLUX DISEASE WITHOUT ESOPHAGITIS: Primary | ICD-10-CM

## 2019-10-03 DIAGNOSIS — K90.0 CELIAC DISEASE: ICD-10-CM

## 2019-10-03 DIAGNOSIS — K62.5 RECTAL BLEEDING: ICD-10-CM

## 2019-10-03 DIAGNOSIS — K31.84 GASTROPARESIS: ICD-10-CM

## 2019-10-03 PROCEDURE — 99214 OFFICE O/P EST MOD 30 MIN: CPT | Performed by: INTERNAL MEDICINE

## 2019-10-03 NOTE — PROGRESS NOTES
PCP:  Oscar Huffman MD     No referring provider defined for this encounter.    Chief Complaint   Patient presents with   • Follow-up     follow up celiac disease        HPI   The patient is known to me with a history of celiac disease.  Interestingly she had a a genotype which was strongly suggestive.  Biopsies were also suggestive although her antibodies were negative.  She is keeping on a stricter diet.  She did have some left-sided chest pain primarily in her breast region.  It was after straining.  A cardiac etiology was ruled out.  She is post cholecystectomy.  She had that on September 8.  She has not really had any trouble since then.  She had a CAT scan on February 14, 2019.  This was normal except changes suggesting gastroparesis.  Gastric emptying scan confirmed delayed emptying.  She had an upper endoscopy done which showed celiac disease and a hiatal hernia as well as gastritis.  This was done in December 2018.  She had a colonoscopy done on 4/9/2019.  This was normal except internal hemorrhoids.  She maintains herself on her antireflux medications.  Does have some trouble with constipation.  She notes a small amount of bleeding dripping into the commode.  This is bright red.  This was primarily with straining.    Allergies   Allergen Reactions   • Sulfa Antibiotics Swelling   • Penicillins Rash          Current Outpatient Medications:   •  aspirin 81 MG EC tablet, Take 81 mg by mouth daily., Disp: , Rfl:   •  atorvastatin (LIPITOR) 10 MG tablet, Take 10 mg by mouth Daily., Disp: , Rfl:   •  Cholecalciferol (VITAMIN D3) 2000 units tablet, Take  by mouth Daily., Disp: , Rfl:   •  clidinium-chlordiazepoxide (LIBRAX) 5-2.5 MG per capsule, Take 1 capsule by mouth Every 6 (Six) Hours., Disp: , Rfl:   •  estradiol (ESTRACE) 0.5 MG tablet, Take 0.25 mg by mouth Daily., Disp: , Rfl:   •  levothyroxine (SYNTHROID, LEVOTHROID) 112 MCG tablet, Take 112 mcg by mouth Daily., Disp: , Rfl:   •  linaclotide  (LINZESS) 72 MCG capsule capsule, Take 1 capsule by mouth Every Morning Before Breakfast., Disp: 30 capsule, Rfl: 11  •  nitroglycerin (NITROSTAT) 0.4 MG SL tablet, Place 0.4 mg under the tongue Every 5 (Five) Minutes As Needed for chest pain. Take no more than 3 doses in 15 minutes., Disp: , Rfl:   •  omeprazole (priLOSEC) 40 MG capsule, Take 40 mg by mouth 2 (Two) Times a Day., Disp: , Rfl:   •  PARoxetine (PAXIL) 40 MG tablet, Take 40 mg by mouth Every Night., Disp: , Rfl:   •  promethazine (PHENERGAN) 25 MG tablet, Take 25 mg by mouth every 4 (four) hours as needed for nausea or vomiting., Disp: , Rfl:   •  sucralfate (CARAFATE) 1 G tablet, Take 1 g by mouth 4 (four) times a day., Disp: , Rfl:      Past Medical History:   Diagnosis Date   • Abnormal US (ultrasound) of abdomen 11/22/2010    US abdomen. No gallstones. 1.8cm benign cyst right kidney   • Abnormal US (ultrasound) of abdomen 11/22/2010    No gallstones. 1.8cm benign cyst right kidney.   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 Cardiolite- Negative. EF 76%.   • CARDIOVASCULAR STRESS TEST  09/17/2019    L. Cardiolite- EF > 70%. Negative.   • CHOLECYSTECTOMY     • ECHO - CONVERTED  09/18/2010    Echo- ([facility]. Dr. Barr) EF 65%   • ECHO - CONVERTED  09/18/2010    D. Echo- ([facility]. Dr. Barr) Negative.   • ECHO - CONVERTED  03/14/2012    Echo-  EF 65-70%   • ECHO - CONVERTED  11/15/2018    EF 55-60%. Mild MR.   • ECHO - CONVERTED  09/17/2019    EF 65%. Trace-Mild MR. RVSP- 22 mmHg.   • HYSTERECTOMY          Social History     Socioeconomic History   • Marital status:      Spouse name: Not on file   • Number of children: Not on file   • Years of education: Not on file   • Highest education level: Not on file   Tobacco Use   • Smoking status: Never Smoker   • Smokeless tobacco: Never Used   Substance and Sexual Activity   • Alcohol use: No   • Drug use: No   • Sexual activity: Defer        Family History   Problem Relation Age of Onset   • Heart disease Father    • Stroke Father    • Hypertension Father    • Heart disease Other    • Stroke Other    • Colon polyps Brother         Review of Systems   Constitutional: Negative.    HENT: Negative for trouble swallowing and voice change.    Gastrointestinal: Positive for blood in stool and constipation.        Vitals:    10/03/19 1427   BP: 117/65   Pulse: 69        Physical Exam   General Appearance: Alert, in no acute distress   Head: Normocephalic, without obvious abnormality, atraumatic   Eyes: Lids and lashes normal, conjunctivae and sclerae normal, no icterus, no pallor, corneas clear, PERRLA   Abdomen: Normal bowel sounds, no masses, no organomegaly, soft non-tender, non-distended, no guarding, no rebound tenderness   Extremities: Moves all extremities well, no edema, no cyanosis, no redness   Skin: No bleeding, bruising or rash   Neurologic: Cranial nerves 2 - 12 grossly intact, no focal deficits     Review of systems was reviewed and positives are noted. All of the remaining review of systems in that system are negative.  Yulia was seen today for follow-up.    Diagnoses and all orders for this visit:    Gastroesophageal reflux disease without esophagitis    Celiac disease    Rectal bleeding    Gastroparesis    Impressions and plan #1 gastroesophageal reflux disease: She should continue her PPI.   Certainly her reflux is worse given any element of gastroparesis.  He seems fairly well controlled.  She will continue more frequent smaller meals and less roughage.    #2 celiac disease: She will continue with a gluten-free diet.  She seems to be adhering to this to a better degree.    #3 rectal bleeding: Rectal bleeding sounds like hemorrhoidal bleeding.  The bleeding is bright red and drips into the commode and is primarily with straining.  She had a recent colonoscopy which was negative.  She is good to take a stool softener or MiraLAX.  I did give her some samples of Linzess.  She takes the 72 mcg dose.  The 145 mcg dose is too strong for her.    #4 gastroparesis: We will continue with her diet and continue on the proton pump inhibitor    #5 constipation: She will take MiraLAX as needed.  I did give her some Linzess samples which she will use intermittently.  We will see her back in 6 months.    Ernesto Mirza MD

## 2020-01-03 ENCOUNTER — TELEPHONE (OUTPATIENT)
Dept: CARDIOLOGY | Facility: CLINIC | Age: 65
End: 2020-01-03

## 2020-01-03 RX ORDER — ISOSORBIDE MONONITRATE 30 MG/1
30 TABLET, EXTENDED RELEASE ORAL EVERY MORNING
Qty: 90 TABLET | Refills: 3 | Status: SHIPPED | OUTPATIENT
Start: 2020-01-03 | End: 2020-01-08 | Stop reason: SDUPTHER

## 2020-01-03 NOTE — TELEPHONE ENCOUNTER
Patient is scheduled to come in office January 8,2020 at 1:45pm . She is made aware of prescription for Imdur 30 mg sent to [facility] . Verbalizes understanding .

## 2020-01-03 NOTE — TELEPHONE ENCOUNTER
Patient called in reporting that has had been to [facility]/ER yesterday with chest pain. And was told to call for appointment. Last office visit was 9-18-19 and next scheduled appointment is March 17,2020 .

## 2020-01-03 NOTE — TELEPHONE ENCOUNTER
I sent in prescription for Imdur 30 mg daily. If she wants to come in on 8th you can add her to my schedule.   detailed exam

## 2020-01-08 ENCOUNTER — OFFICE VISIT (OUTPATIENT)
Dept: CARDIOLOGY | Facility: CLINIC | Age: 65
End: 2020-01-08

## 2020-01-08 VITALS
BODY MASS INDEX: 28.02 KG/M2 | HEART RATE: 74 BPM | WEIGHT: 189.2 LBS | SYSTOLIC BLOOD PRESSURE: 118 MMHG | HEIGHT: 69 IN | DIASTOLIC BLOOD PRESSURE: 70 MMHG

## 2020-01-08 DIAGNOSIS — R07.89 CHEST PAIN, ATYPICAL: ICD-10-CM

## 2020-01-08 DIAGNOSIS — F41.9 ANXIETY: ICD-10-CM

## 2020-01-08 DIAGNOSIS — E78.2 MIXED HYPERLIPIDEMIA: ICD-10-CM

## 2020-01-08 DIAGNOSIS — I83.93 VARICOSE VEINS OF BOTH LOWER EXTREMITIES, UNSPECIFIED WHETHER COMPLICATED: ICD-10-CM

## 2020-01-08 DIAGNOSIS — I10 ESSENTIAL HYPERTENSION: ICD-10-CM

## 2020-01-08 PROCEDURE — 99214 OFFICE O/P EST MOD 30 MIN: CPT | Performed by: NURSE PRACTITIONER

## 2020-01-08 RX ORDER — ISOSORBIDE MONONITRATE 30 MG/1
30 TABLET, EXTENDED RELEASE ORAL EVERY MORNING
Qty: 90 TABLET | Refills: 3 | Status: SHIPPED | OUTPATIENT
Start: 2020-01-08 | End: 2020-03-02
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
## 2020-02-10 ENCOUNTER — TELEPHONE (OUTPATIENT)
Dept: CARDIOLOGY | Facility: CLINIC | Age: 65
End: 2020-02-10

## 2020-02-10 NOTE — TELEPHONE ENCOUNTER
Patient called in and reports that she had been to Urgent care over the weekend , for chest pain .  I called her back, received voicemail , left message for call back.

## 2020-02-11 ENCOUNTER — TELEPHONE (OUTPATIENT)
Dept: CARDIOLOGY | Facility: CLINIC | Age: 65
End: 2020-02-11

## 2020-02-11 DIAGNOSIS — I20.8 ANGINAL EQUIVALENT (HCC): Primary | ICD-10-CM

## 2020-02-11 NOTE — TELEPHONE ENCOUNTER
I spoke with patient about  Urgent care visit on Sunday. She reports that  She had taken a Baby Aspirin and Nitroglycerin  With relief. She said they obtained EKG and said no changes since EKG done September 2019 . No episodes of chest pain  Since but had a vagal response this morning.

## 2020-02-11 NOTE — TELEPHONE ENCOUNTER
Based on her last stress test, cardiac cath recommended for definitive diagnoses if symptoms persist. Does she want to proceed with cath at this time?

## 2020-02-13 NOTE — TELEPHONE ENCOUNTER
[facility] scheduled on 2/24/2020.  See [facility] cath referral for further communications.

## 2020-02-18 ENCOUNTER — OUTSIDE FACILITY SERVICE (OUTPATIENT)
Dept: CARDIOLOGY | Facility: CLINIC | Age: 65
End: 2020-02-18

## 2020-02-18 PROCEDURE — 93458 L HRT ARTERY/VENTRICLE ANGIO: CPT | Performed by: INTERNAL MEDICINE

## 2020-02-19 ENCOUNTER — PRIOR AUTHORIZATION (OUTPATIENT)
Dept: CARDIOLOGY | Facility: CLINIC | Age: 65
End: 2020-02-19

## 2020-02-19 NOTE — TELEPHONE ENCOUNTER
[facility] called for PA for cardiac CTA (CPT 76463).  Scheduled 2/20/2020 at 8:30 am.  Dr. Bourgeois ordered cardiac CTA at hospital after cardiac cath.    [employer] [place] PA #[id number], valid 2/19/2020-3/19/2020.  PA faxed to [facility].

## 2020-02-20 ENCOUNTER — TELEPHONE (OUTPATIENT)
Dept: CARDIOLOGY | Facility: CLINIC | Age: 65
End: 2020-02-20

## 2020-02-20 NOTE — TELEPHONE ENCOUNTER
Patient aware of cardiac CTA results and recommendations to start Lopressor 25 mg BID.  Script sent to [facility].  Patient aware scheduling is working on getting her a sooner appointment.    Patient also reports Dr. Bourgeois stopped her Imdur and started her on Bentyl on the day of her cardiac cath.

## 2020-02-20 NOTE — TELEPHONE ENCOUNTER
VO from Dr. Bourgeois-start patient on Lopressor 25 mg BID secondary to cardiac CTA results (anomalous course of RCA, arises from left coronary cusp and passes between the aorta and pulmonary artery)      Dr. HEARD - her follow up is 4/16/2020.  Do you want it sooner?

## 2020-03-02 ENCOUNTER — OFFICE VISIT (OUTPATIENT)
Dept: CARDIOLOGY | Facility: CLINIC | Age: 65
End: 2020-03-02

## 2020-03-02 VITALS
HEIGHT: 69 IN | DIASTOLIC BLOOD PRESSURE: 78 MMHG | SYSTOLIC BLOOD PRESSURE: 118 MMHG | WEIGHT: 184 LBS | BODY MASS INDEX: 27.25 KG/M2 | HEART RATE: 76 BPM

## 2020-03-02 DIAGNOSIS — K21.9 GASTROESOPHAGEAL REFLUX DISEASE WITHOUT ESOPHAGITIS: ICD-10-CM

## 2020-03-02 DIAGNOSIS — F41.9 ANXIETY: ICD-10-CM

## 2020-03-02 DIAGNOSIS — Q24.5 ANOMALOUS ORIGIN OF RIGHT CORONARY ARTERY: ICD-10-CM

## 2020-03-02 DIAGNOSIS — E03.8 OTHER SPECIFIED HYPOTHYROIDISM: ICD-10-CM

## 2020-03-02 DIAGNOSIS — R07.89 CHEST HEAVINESS: ICD-10-CM

## 2020-03-02 DIAGNOSIS — E78.2 MIXED HYPERLIPIDEMIA: ICD-10-CM

## 2020-03-02 DIAGNOSIS — I10 ESSENTIAL HYPERTENSION: Primary | ICD-10-CM

## 2020-03-02 PROCEDURE — 99213 OFFICE O/P EST LOW 20 MIN: CPT | Performed by: INTERNAL MEDICINE

## 2020-03-02 RX ORDER — MULTIPLE VITAMINS W/ MINERALS TAB 9MG-400MCG
1 TAB ORAL DAILY
COMMUNITY
End: 2020-09-14

## 2020-03-05 ENCOUNTER — TELEPHONE (OUTPATIENT)
Dept: CARDIOLOGY | Facility: CLINIC | Age: 65
End: 2020-03-05

## 2020-03-05 DIAGNOSIS — Q24.5 ANOMALOUS ORIGIN OF RIGHT CORONARY ARTERY: Primary | ICD-10-CM

## 2020-03-05 NOTE — TELEPHONE ENCOUNTER
Patient called, she has a few questions that she did not think to ask at her follow up visit.    1) She has a trip planned to [place] in April for a week and [facility].. In May.  She will be riding in car.  Are you ok with her going?    2) She also asked IF she does decide to have a consult with a CT surgeon to review her films of cardiac cath and cardiac CTA, who would you recommend?

## 2020-03-06 NOTE — TELEPHONE ENCOUNTER
Patient aware she can go on her trips and she does want to proceed with referral to Dr. Esteban for consultation to review films of LHC and cardiac CTA.  I will make appointment and notify patient.  I did advise her she will need to  her films of both tests and take with her to the appointment with Dr. Esteban.

## 2020-03-10 NOTE — TELEPHONE ENCOUNTER
Consult with Dr. Esteban scheduled on 3/19/2020 at 9:40 am.  Referral placed for cosign.  See referral for further communications.

## 2020-04-23 ENCOUNTER — OFFICE VISIT (OUTPATIENT)
Dept: GASTROENTEROLOGY | Facility: CLINIC | Age: 65
End: 2020-04-23

## 2020-04-23 DIAGNOSIS — K62.5 RECTAL BLEEDING: ICD-10-CM

## 2020-04-23 DIAGNOSIS — R10.32 LEFT LOWER QUADRANT PAIN: ICD-10-CM

## 2020-04-23 DIAGNOSIS — K90.0 CELIAC DISEASE: Primary | ICD-10-CM

## 2020-04-23 DIAGNOSIS — K21.9 GASTROESOPHAGEAL REFLUX DISEASE WITHOUT ESOPHAGITIS: ICD-10-CM

## 2020-04-23 DIAGNOSIS — K59.00 CONSTIPATION, UNSPECIFIED CONSTIPATION TYPE: ICD-10-CM

## 2020-04-23 PROCEDURE — 99442 PR PHYS/QHP TELEPHONE EVALUATION 11-20 MIN: CPT | Performed by: INTERNAL MEDICINE

## 2020-04-23 RX ORDER — DICYCLOMINE HYDROCHLORIDE 10 MG/1
10 CAPSULE ORAL
COMMUNITY
End: 2020-09-14

## 2020-04-23 RX ORDER — ONDANSETRON 4 MG/1
4 TABLET, FILM COATED ORAL EVERY 8 HOURS PRN
COMMUNITY
End: 2020-09-14

## 2020-04-23 NOTE — PROGRESS NOTES
PCP:  Carissa Montejo APRN     You have chosen to receive care through a telephone visit. Do you consent to use a telephone visit for your medical care today? Yes    No referring provider defined for this encounter.    Chief Complaint   Patient presents with   • Celiac Disease   • Constipation        HPI   The patient is known to me with a history of celiac disease.  He is maintaining her celiac diet.  She has become more constipated recently.  She did have a bowel movement for about 3 days and decided to disimpact herself.  She then became became nauseated and started having abdominal cramps.  She finally saw her physician who sent her to the hospital for a CAT scan.  The CAT scan apparently showed no evidence of diverticulitis or acute abnormality as a cause of her symptoms.  She did have some rectal bleeding as well.  That since has subsided.  Her abdominal pain has improved but is not completely gone.  She sometimes has it in what sounds like the epigastrium and sometimes in the left upper quadrant radiating down into the left lower quadrant.  She is going to take a Linzess to see if it does not help.  She had a colonoscopy in 4/9/2019.  This was normal except internal hemorrhoids.  She did have fixation of the sigmoid due to adhesions likely.  She is going to be evaluated by cardiac surgery.  She apparently has an abnormality in her coronary arteries noted by her cardiologist.    Allergies   Allergen Reactions   • Sulfa Antibiotics Swelling   • Penicillins Rash          Current Outpatient Medications:   •  dicyclomine (BENTYL) 10 MG capsule, Take 10 mg by mouth 4 (Four) Times a Day Before Meals & at Bedtime., Disp: , Rfl:   •  ondansetron (ZOFRAN) 4 MG tablet, Take 4 mg by mouth Every 8 (Eight) Hours As Needed for Nausea or Vomiting., Disp: , Rfl:   •  aspirin 81 MG EC tablet, Take 81 mg by mouth daily., Disp: , Rfl:   •  atorvastatin (LIPITOR) 10 MG tablet, Take 10 mg by mouth Daily., Disp: , Rfl:    •  Cholecalciferol (VITAMIN D3) 2000 units tablet, Take  by mouth Daily., Disp: , Rfl:   •  clidinium-chlordiazepoxide (LIBRAX) 5-2.5 MG per capsule, Take 1 capsule by mouth Every 6 (Six) Hours., Disp: , Rfl:   •  estradiol (ESTRACE) 0.5 MG tablet, Take 0.25 mg by mouth Daily., Disp: , Rfl:   •  levothyroxine (SYNTHROID, LEVOTHROID) 112 MCG tablet, Take 112 mcg by mouth Daily., Disp: , Rfl:   •  linaclotide (LINZESS) 72 MCG capsule capsule, Take 1 capsule by mouth Every Morning Before Breakfast., Disp: 30 capsule, Rfl: 11  •  metoprolol tartrate (LOPRESSOR) 25 MG tablet, Take 1 tablet by mouth 2 (Two) Times a Day., Disp: 60 tablet, Rfl: 11  •  Multiple Vitamins-Minerals (MULTIVITAMIN WITH MINERALS) tablet tablet, Take 1 tablet by mouth Daily., Disp: , Rfl:   •  nitroglycerin (NITROSTAT) 0.4 MG SL tablet, Place 0.4 mg under the tongue Every 5 (Five) Minutes As Needed for chest pain. Take no more than 3 doses in 15 minutes., Disp: , Rfl:   •  omeprazole (priLOSEC) 40 MG capsule, Take 40 mg by mouth 2 (Two) Times a Day., Disp: , Rfl:   •  sucralfate (CARAFATE) 1 G tablet, Take 1 g by mouth 4 (four) times a day., Disp: , Rfl:      Past Medical History:   Diagnosis Date   • Abnormal US (ultrasound) of abdomen 11/22/2010    US abdomen. No gallstones. 1.8cm benign cyst right kidney   • Abnormal US (ultrasound) of abdomen 11/22/2010    No gallstones. 1.8cm benign cyst right kidney.   • Anxiety and depression    • Celiac disease    • H/O: hysterectomy    • History of appendectomy    • History of basal cell cancer     removed   • Hyperlipidemia    • Hyperthyroidism    • Optional surgery     Adhesions removed as a child       Past Surgical History:   Procedure Laterality Date   • APPENDECTOMY     • CARDIAC CATHETERIZATION  10/25/2013    Anomalous origin of RCA, normal coronaries, EF65%   • CARDIAC CATHETERIZATION  02/18/2020    RCA arising from (L) Coronary Cusp.   • CARDIOVASCULAR STRESS TEST  03/14/2012    Stress- 9min,  85% THR./72, negative for ischemia   • CARDIOVASCULAR STRESS TEST  10/24/2013    L.Myoview- ([facility]) small apical infarct versus breast attenuation   • CARDIOVASCULAR STRESS TEST  11/15/2018    L. Cardiolite- Negative. EF 76%.   • CARDIOVASCULAR STRESS TEST  09/17/2019    L. Cardiolite- EF > 70%. Negative.   • CHOLECYSTECTOMY     • ECHO - CONVERTED  09/18/2010    Echo- ([facility]. Dr. Barr) EF 65%   • ECHO - CONVERTED  09/18/2010    D. Echo- ([facility]. Dr. Barr) Negative.   • ECHO - CONVERTED  03/14/2012    Echo- EF 65-70%   • ECHO - CONVERTED  11/15/2018    EF 55-60%. Mild MR.   • ECHO - CONVERTED  09/17/2019    EF 65%. Trace-Mild MR. RVSP- 22 mmHg.   • HYSTERECTOMY     • OTHER SURGICAL HISTORY  02/20/2020    CTA Heart- Anamalous origina nd Course of RCA. Between Aorta and PA        Social History     Socioeconomic History   • Marital status:      Spouse name: Not on file   • Number of children: Not on file   • Years of education: Not on file   • Highest education level: Not on file   Tobacco Use   • Smoking status: Never Smoker   • Smokeless tobacco: Never Used   Substance and Sexual Activity   • Alcohol use: No   • Drug use: No   • Sexual activity: Defer        Family History   Problem Relation Age of Onset   • Heart disease Father    • Stroke Father    • Hypertension Father    • Heart disease Other    • Stroke Other    • Colon polyps Brother         Review of Systems   Constitutional: Positive for activity change, fatigue and fever.   HENT: Negative for trouble swallowing and voice change.    Gastrointestinal: Positive for abdominal pain, anal bleeding, constipation, nausea and rectal pain.        Vitals:        Physical Exam this was a telephone visit    Review of systems was reviewed and positives are noted. All of the remaining review of systems in that system are negative.    Yulia was seen today for celiac disease and constipation.    Diagnoses and all orders for this visit:    Celiac disease    Rectal  bleeding    Gastroesophageal reflux disease without esophagitis    Left lower quadrant pain    Constipation, unspecified constipation type    Impressions and plan #1 celiac disease: She is having a hard time but she is adhering to her celiac diet.  She thinks that may have changed her bowel function as well.    #2 gastroesophageal reflux disease: She maintains her self on a proton pump inhibitor and seems to be doing well from that standpoint.    #3 constipation with rectal bleeding: I suspect she caused a minor trauma causing a little bleeding after her disimpaction.  This has resolved.  She had a fairly recent colonoscopy which was relatively normal.  She can use MiraLAX to help with the bowel movements.  She can take milk of magnesia if necessary.  She is going to call us if this does not appear to improve.  It sounds like she has had some bowel movements since that time and things appear to be slowly improving.  I would like to get her results of her CAT scan for my records.  She has a follow-up already scheduled but will contact us if things are not improving.    The visit was 12 minutes long    Ernesto Mirza MD

## 2020-06-23 ENCOUNTER — TELEPHONE (OUTPATIENT)
Dept: CARDIOLOGY | Facility: CLINIC | Age: 65
End: 2020-06-23

## 2020-06-23 RX ORDER — METOPROLOL TARTRATE 50 MG/1
50 TABLET, FILM COATED ORAL 2 TIMES DAILY
Qty: 60 TABLET | Refills: 11 | Status: SHIPPED | OUTPATIENT
Start: 2020-06-23 | End: 2020-09-14 | Stop reason: DRUGHIGH

## 2020-06-23 NOTE — TELEPHONE ENCOUNTER
Patient called stating she was advised by [facility] ER to schedule appointment with Dr. Bourgeois. She had went to ER with chest pain.    How soon?    [facility] ER note, CXR printed to scan in.    Her 5/14/2020 consult with Dr. Esteban is in media also.

## 2020-06-23 NOTE — TELEPHONE ENCOUNTER
Patient aware of recommendations to increase metoprolol tartrate to 50 mg BID.  New script sent [place].      Patient aware scheduling will be calling her to schedule appointment.    She did see Dr. Esteban for consult.  No other appointment.  She does understand if she is having more problem she will need surgery.  She is very hesitant to proceed.

## 2020-06-23 NOTE — TELEPHONE ENCOUNTER
Has she had any more apt with [facility]?  If she is having more problem she need surgery.  Can see her next week.  Increase Lopressor to 50 mg Bid

## 2020-06-26 NOTE — TELEPHONE ENCOUNTER
APPT MADE 06/29/2020 @ 2:15 WITH DR. DELA CRUZ. CALLED AND SPOKE WITH PATIENT'S  AND HE IS AWARE OF APPT.

## 2020-06-29 ENCOUNTER — OFFICE VISIT (OUTPATIENT)
Dept: CARDIOLOGY | Facility: CLINIC | Age: 65
End: 2020-06-29

## 2020-06-29 VITALS
HEART RATE: 64 BPM | HEIGHT: 69 IN | TEMPERATURE: 98.6 F | BODY MASS INDEX: 27.11 KG/M2 | WEIGHT: 183 LBS | SYSTOLIC BLOOD PRESSURE: 112 MMHG | DIASTOLIC BLOOD PRESSURE: 68 MMHG

## 2020-06-29 DIAGNOSIS — I10 ESSENTIAL HYPERTENSION: ICD-10-CM

## 2020-06-29 DIAGNOSIS — R07.89 CHEST PRESSURE: Primary | ICD-10-CM

## 2020-06-29 DIAGNOSIS — E03.8 OTHER SPECIFIED HYPOTHYROIDISM: ICD-10-CM

## 2020-06-29 DIAGNOSIS — Q24.5 ANOMALOUS RIGHT CORONARY ARTERY: ICD-10-CM

## 2020-06-29 DIAGNOSIS — E78.00 HYPERCHOLESTEREMIA: ICD-10-CM

## 2020-06-29 PROCEDURE — 99214 OFFICE O/P EST MOD 30 MIN: CPT | Performed by: INTERNAL MEDICINE

## 2020-06-29 RX ORDER — ISOSORBIDE MONONITRATE 30 MG/1
30 TABLET, EXTENDED RELEASE ORAL EVERY MORNING
Qty: 30 TABLET | Refills: 11 | Status: SHIPPED | OUTPATIENT
Start: 2020-06-29 | End: 2020-09-14

## 2020-06-29 RX ORDER — ATORVASTATIN CALCIUM 20 MG/1
20 TABLET, FILM COATED ORAL DAILY
Qty: 30 TABLET | Refills: 11 | Status: SHIPPED | OUTPATIENT
Start: 2020-06-29 | End: 2020-09-14 | Stop reason: SDUPTHER

## 2020-06-29 NOTE — PROGRESS NOTES
Chief Complaint   Patient presents with   • Follow-up     for cardiac management   • Chest Pain     went to ER last Monday with chest pain, see telephone encounter, ER notes in chart, no chest pain since ER visit.    • Labs     pt brought copy of most recent labs from January and June   • Dr Esteban     last saw him in May, note in chart.        CARDIAC COMPLAINTS  chest pressure/discomfort, dyspnea and fatigue        Subjective   Yulia Abebe is a 65 y.o. female came in today for her hospital follow-up visit.  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  She was put on low-dose of beta-blockers and has done fairly well with it.  She also was seen by the CT surgeon who suggested surgery if she continues to have chest pain.  She apparently went to the emergency room few days ago with increasing chest pain.  She apparently was not feeling good 2 days prior to that and on that day she got severe chest tightness and pressure-like feeling was seen in the emergency room.  EKG and enzymes were within normal limit.  I did increase the dose of the beta-blockers at that time.  Since then she has not had any other chest pain.  She did bring copy of her labs today.  Her cholesterol went up to 196 and the LDL has gone up to 103.  Her liver function is still within normal limit.  She still has some shortness of breath but the chest discomfort is better.              Cardiac History  Past Surgical History:   Procedure Laterality Date   • APPENDECTOMY     • CARDIAC CATHETERIZATION  10/25/2013    Anomalous origin of RCA, normal coronaries, EF65%   • CARDIAC CATHETERIZATION  02/18/2020    RCA arising from (L) Coronary Cusp.   • CARDIOVASCULAR STRESS TEST  03/14/2012    Stress- 9min, 85% THR./72, negative for ischemia   • CARDIOVASCULAR STRESS TEST  10/24/2013    L.Myoview- ([facility]) small apical infarct versus breast attenuation   • CARDIOVASCULAR STRESS TEST   11/15/2018    L. Cardiolite- Negative. EF 76%.   • CARDIOVASCULAR STRESS TEST  09/17/2019    L. Cardiolite- EF > 70%. Negative.   • CHOLECYSTECTOMY     • ECHO - CONVERTED  09/18/2010    Echo- ([facility]. Dr. Barr) EF 65%   • ECHO - CONVERTED  09/18/2010    [facility]   • ECHO - CONVERTED  03/14/2012    Echo- EF 65-70%   • ECHO - CONVERTED  11/15/2018    EF 55-60%. Mild MR.   • ECHO - CONVERTED  09/17/2019    EF 65%. Trace-Mild MR. RVSP- 22 mmHg.   • HYSTERECTOMY     • OTHER SURGICAL HISTORY  02/20/2020    CTA Heart- Anamalous origina nd Course of RCA. Between Aorta and PA       Current Outpatient Medications   Medication Sig Dispense Refill   • aspirin 81 MG EC tablet Take 81 mg by mouth daily.     • Cholecalciferol (VITAMIN D3) 2000 units tablet Take  by mouth Daily.     • clidinium-chlordiazepoxide (LIBRAX) 5-2.5 MG per capsule Take 1 capsule by mouth Every 6 (Six) Hours.     • dicyclomine (BENTYL) 10 MG capsule Take 10 mg by mouth 4 (Four) Times a Day Before Meals & at Bedtime.     • estradiol (ESTRACE) 0.5 MG tablet Take 0.25 mg by mouth Daily.     • levothyroxine (SYNTHROID, LEVOTHROID) 112 MCG tablet Take 112 mcg by mouth Daily.     • linaclotide (Linzess) 72 MCG capsule capsule Take 1 capsule by mouth Every Morning Before Breakfast. 30 capsule 11   • metoprolol tartrate (LOPRESSOR) 50 MG tablet Take 1 tablet by mouth 2 (Two) Times a Day. 60 tablet 11   • nitroglycerin (NITROSTAT) 0.4 MG SL tablet Place 0.4 mg under the tongue Every 5 (Five) Minutes As Needed for chest pain. Take no more than 3 doses in 15 minutes.     • omeprazole (priLOSEC) 40 MG capsule Take 40 mg by mouth 2 (Two) Times a Day.     • ondansetron (ZOFRAN) 4 MG tablet Take 4 mg by mouth Every 8 (Eight) Hours As Needed for Nausea or Vomiting.     • sucralfate (CARAFATE) 1 G tablet Take 1 g by mouth 4 (four) times a day.     • atorvastatin (LIPITOR) 20 MG tablet Take 1 tablet by mouth Daily. 30 tablet 11   • isosorbide  mononitrate (IMDUR) 30 MG 24 hr tablet Take 1 tablet by mouth Every Morning. 30 tablet 11   • Multiple Vitamins-Minerals (MULTIVITAMIN WITH MINERALS) tablet tablet Take 1 tablet by mouth Daily.       No current facility-administered medications for this visit.        Allergies  :  Sulfa antibiotics and Penicillins       Past Medical History:   Diagnosis Date   • Abnormal US (ultrasound) of abdomen 11/22/2010    US abdomen. No gallstones. 1.8cm benign cyst right kidney   • Abnormal US (ultrasound) of abdomen 11/22/2010    No gallstones. 1.8cm benign cyst right kidney.   • Anxiety and depression    • Celiac disease    • H/O: hysterectomy    • History of appendectomy    • History of basal cell cancer     removed   • Hyperlipidemia    • Hyperthyroidism    • Optional surgery     Adhesions removed as a child       Social History     Socioeconomic History   • Marital status:      Spouse name: Not on file   • Number of children: Not on file   • Years of education: Not on file   • Highest education level: Not on file   Tobacco Use   • Smoking status: Never Smoker   • Smokeless tobacco: Never Used   Substance and Sexual Activity   • Alcohol use: No   • Drug use: No   • Sexual activity: Defer       Family History   Problem Relation Age of Onset   • Heart disease Father    • Stroke Father    • Hypertension Father    • Heart disease Other    • Stroke Other    • Colon polyps Brother        Review of Systems   Constitution: Positive for malaise/fatigue. Negative for decreased appetite.   HENT: Negative for congestion and sore throat.    Eyes: Negative for blurred vision.   Cardiovascular: Positive for chest pain and dyspnea on exertion.   Respiratory: Positive for shortness of breath. Negative for snoring.    Endocrine: Negative for cold intolerance and heat intolerance.   Hematologic/Lymphatic: Negative for adenopathy. Does not bruise/bleed easily.   Skin: Negative for itching, nail changes and skin cancer.    "  Musculoskeletal: Negative for arthritis and myalgias.   Gastrointestinal: Negative for abdominal pain, dysphagia and heartburn.   Genitourinary: Negative for bladder incontinence and frequency.   Neurological: Negative for dizziness, light-headedness, seizures and vertigo.   Psychiatric/Behavioral: Negative for altered mental status.   Allergic/Immunologic: Negative for environmental allergies and hives.       Diabetes- No  Thyroid- Abnormal    Objective     /68   Pulse 64   Temp 98.6 °F (37 °C)   Ht 175.3 cm (69\")   Wt 83 kg (183 lb)   BMI 27.02 kg/m²     Physical Exam   Constitutional: She is oriented to person, place, and time. She appears well-developed and well-nourished.   HENT:   Head: Normocephalic.   Nose: Nose normal.   Eyes: Pupils are equal, round, and reactive to light. EOM are normal.   Neck: Normal range of motion. Neck supple.   Cardiovascular: Normal rate, regular rhythm, S1 normal and S2 normal.   Murmur heard.  Pulmonary/Chest: Effort normal and breath sounds normal.   Abdominal: Soft. Bowel sounds are normal.   Musculoskeletal: Normal range of motion. She exhibits no edema.   Neurological: She is alert and oriented to person, place, and time.   Skin: Skin is warm and dry.   Psychiatric: She has a normal mood and affect.     Procedures            Assessment/Plan   Patient's Body mass index is 27.02 kg/m². BMI is above normal parameters. Recommendations include: nutrition counseling.     Yulia was seen today for follow-up, chest pain, labs and dr guerrero.    Diagnoses and all orders for this visit:    Chest pressure  -     isosorbide mononitrate (IMDUR) 30 MG 24 hr tablet; Take 1 tablet by mouth Every Morning.    Essential hypertension    Anomalous right coronary artery    Other specified hypothyroidism    Hypercholesteremia  -     atorvastatin (LIPITOR) 20 MG tablet; Take 1 tablet by mouth Daily.       At baseline her heart rate and blood pressure appear stable.  Her BMI is around 27.  " Her clinical examination is unremarkable other than a short systolic murmur at the mitral area.  She has no pedal edema.    Regarding the chest tightness or chest pressure it is most likely secondary to the anomalous origin of the RCA and getting compressed between the heart and pulmonary artery.  I had a long talk with her again.  I talked to her about diet and weight reduction.  Since the higher dose of beta-blockers is helping it, continue the same.  I did add 30 mg of Imdur.  She is also advised to use her sublingual nitroglycerin as needed.  If the symptoms gets worse then she may need to undergo the surgery to have it reimplanted.    Regarding the hypertension, she seems to be doing better with the higher dose of beta-blockers.  Continue the same hopefully the Imdur will continue to help.    Regarding the right coronary artery, I explained to her in detail about the anatomy as well as the physiology behind it.  As long we keep the blood pressure control and also keep the PA not dilated then we can try to avoid the surgery.    Regarding the hypercholesterolemia like to keep the LDL as low as possible.  I increased the dose of Lipitor to 20 mg once a day.    Regarding the metabolic syndrome, had a long talk with her about diet.  I talked to her about cutting down on the carbohydrate intake.    I will see her back in few months.  She is advised to call our office if that is more anginal pain.                Electronically signed by Kris Bourgeois MD June 29, 2020 16:46

## 2020-07-02 ENCOUNTER — TELEPHONE (OUTPATIENT)
Dept: CARDIOLOGY | Facility: CLINIC | Age: 65
End: 2020-07-02

## 2020-07-02 NOTE — TELEPHONE ENCOUNTER
Yulia called asking if she is to continue her metoprolol with the Imdur.  I advised patient to continue her metoprolol 50 mg BID along with the addition of Imdur 30 mg daily.

## 2020-07-14 ENCOUNTER — TELEPHONE (OUTPATIENT)
Dept: CARDIOLOGY | Facility: CLINIC | Age: 65
End: 2020-07-14

## 2020-07-14 NOTE — TELEPHONE ENCOUNTER
Patient woke up around 3:00 am with chest pain.  She took ASA 81 mg, but the chest pain persisted.  Her  called EMS.  She was transported to [facility] ER.  While she was at ER, she states she had the worse episode of chest pain she has had every had.  Stabbing chest pain for about 3 minutes, she thought she was dying.    She was evaluated by EKG and labs and released.  She states she has decided she probably needs to proceed with surgery with Dr. Esteban.

## 2020-07-14 NOTE — TELEPHONE ENCOUNTER
Patient aware.  Dr. Esteban's nurse, Falguni, gave patient a phone number to contact when she is ready to proceed with surgery.  Patient is going to contact Falguni to see what her next step is.

## 2020-08-26 ENCOUNTER — DOCUMENTATION (OUTPATIENT)
Dept: CARDIAC REHAB | Facility: HOSPITAL | Age: 65
End: 2020-08-26

## 2020-09-14 ENCOUNTER — OFFICE VISIT (OUTPATIENT)
Dept: CARDIOLOGY | Facility: CLINIC | Age: 65
End: 2020-09-14

## 2020-09-14 VITALS
HEIGHT: 69 IN | WEIGHT: 173 LBS | SYSTOLIC BLOOD PRESSURE: 114 MMHG | BODY MASS INDEX: 25.62 KG/M2 | TEMPERATURE: 98.4 F | HEART RATE: 59 BPM | DIASTOLIC BLOOD PRESSURE: 60 MMHG

## 2020-09-14 DIAGNOSIS — E78.00 HYPERCHOLESTEREMIA: ICD-10-CM

## 2020-09-14 DIAGNOSIS — R00.2 PALPITATIONS: ICD-10-CM

## 2020-09-14 DIAGNOSIS — Z87.09 HX OF PLEURAL EFFUSION: ICD-10-CM

## 2020-09-14 DIAGNOSIS — Q24.5 ANOMALOUS ORIGIN OF RIGHT CORONARY ARTERY: Primary | ICD-10-CM

## 2020-09-14 DIAGNOSIS — I10 ESSENTIAL HYPERTENSION: ICD-10-CM

## 2020-09-14 DIAGNOSIS — I48.0 PAF (PAROXYSMAL ATRIAL FIBRILLATION) (HCC): ICD-10-CM

## 2020-09-14 PROCEDURE — 99214 OFFICE O/P EST MOD 30 MIN: CPT | Performed by: INTERNAL MEDICINE

## 2020-09-14 PROCEDURE — 93000 ELECTROCARDIOGRAM COMPLETE: CPT | Performed by: INTERNAL MEDICINE

## 2020-09-14 RX ORDER — METOPROLOL TARTRATE 50 MG/1
25 TABLET, FILM COATED ORAL 2 TIMES DAILY
Qty: 60 TABLET | Refills: 6 | Status: SHIPPED | OUTPATIENT
Start: 2020-09-14 | End: 2021-03-16 | Stop reason: SDUPTHER

## 2020-09-14 RX ORDER — AMIODARONE HYDROCHLORIDE 100 MG/1
100 TABLET ORAL DAILY
Qty: 30 TABLET | Refills: 6 | Status: SHIPPED | OUTPATIENT
Start: 2020-09-14 | End: 2021-03-16 | Stop reason: SDUPTHER

## 2020-09-14 RX ORDER — ATORVASTATIN CALCIUM 20 MG/1
20 TABLET, FILM COATED ORAL DAILY
Qty: 30 TABLET | Refills: 11 | Status: SHIPPED | OUTPATIENT
Start: 2020-09-14 | End: 2021-03-16 | Stop reason: SDUPTHER

## 2020-09-14 RX ORDER — AMIODARONE HYDROCHLORIDE 200 MG/1
200 TABLET ORAL DAILY
COMMUNITY
End: 2020-09-14

## 2020-09-14 RX ORDER — METOPROLOL TARTRATE 50 MG/1
TABLET, FILM COATED ORAL
COMMUNITY
End: 2020-09-14 | Stop reason: SDUPTHER

## 2020-09-14 RX ORDER — DOCUSATE SODIUM 100 MG/1
100 CAPSULE, LIQUID FILLED ORAL 2 TIMES DAILY
COMMUNITY
End: 2022-03-31

## 2020-09-14 NOTE — PROGRESS NOTES
Chief Complaint   Patient presents with   • Follow-up     for cardiac management   • Procedure     7/20/20 Dr Esteban did  'mobilization and reimplantation of origin of anomalous of RCA'.  Post op Afib noted, successful cardioversion 7/30/20. Amiodarone and Eliquis started. Dig was started then stopped at last office visit to see Dr Esteban.    • Chest Pain     one episode of chest pain, a few days ago, woke up with mid chest pain around her incision and under left breast, eased off after 2-3 hours.    • Med Refill     refills needed on cardiac meds, 90 days to [facility]   • Palpitations     did have one episode feeling like her heart was irreg last Wednesday, woke with palpitations, woke about 5 this morning with palpitations.       CARDIAC COMPLAINTS  chest pressure/discomfort, dyspnea and fatigue        Subjective   Yulia Abebe is a 65 y.o. female came in today for her hospital follow-up visit.  She has history of chest pain and shortness of breath.  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  She has been tried manage medically with increasing beta-blockers and nitrates but the symptoms persisted.  She was referred to the CT surgery service at .  She underwent surgery with reimplanting the RCA to anterior part of the aorta.  Her postoperative course was complicated with PAF for which she underwent JESSY and cardioversion.  She was discharged home on amiodarone and Eliquis.  She came today for the first follow-up visit.  She still has some occasional episode of chest pain but most of the pain occurs at the insertion site.  She still has some episodes of palpitation.  She had one episode last Wednesday and she had another episode this morning.  She is not sure when was the last time she had labs done.  She did undergo an x-ray chest about a month ago and was told that she has some mild pleural  effusion.              Cardiac History  Past Surgical History:   Procedure Laterality Date   • APPENDECTOMY     • CARDIAC CATHETERIZATION  10/25/2013    Anomalous origin of RCA, normal coronaries, EF65%   • CARDIAC CATHETERIZATION  02/18/2020    RCA arising from (L) Coronary Cusp.   • CARDIOVASCULAR STRESS TEST  03/14/2012    Stress- 9min, 85% THR./72, negative for ischemia   • CARDIOVASCULAR STRESS TEST  10/24/2013    L.Myoview- ([facility]) small apical infarct versus breast attenuation   • CARDIOVASCULAR STRESS TEST  11/15/2018    L. Cardiolite- Negative. EF 76%.   • CARDIOVASCULAR STRESS TEST  09/17/2019    L. Cardiolite- EF > 70%. Negative.   • CHOLECYSTECTOMY     • CORONARY ARTERY BYPASS GRAFT  07/20/2020    Dr. Esteban- Reimplant of RCA   • ECHO - CONVERTED  09/18/2010    Echo- ([facility]. Dr. Barr) EF 65%   • ECHO - CONVERTED  09/18/2010    D. Echo- ([facility]. Dr. Barr) Negative.   • ECHO - CONVERTED  03/14/2012    Echo- EF 65-70%   • ECHO - CONVERTED  11/15/2018    EF 55-60%. Mild MR.   • ECHO - CONVERTED  09/17/2019    EF 65%. Trace-Mild MR. RVSP- 22 mmHg.   • HYSTERECTOMY     • OTHER SURGICAL HISTORY  02/20/2020    CTA Heart- Anamalous origina nd Course of RCA. Between Aorta and PA       Current Outpatient Medications   Medication Sig Dispense Refill   • apixaban (ELIQUIS) 5 MG tablet tablet Take 1 tablet by mouth Every 12 (Twelve) Hours. 60 tablet 6   • aspirin 81 MG EC tablet Take 81 mg by mouth daily.     • atorvastatin (LIPITOR) 20 MG tablet Take 1 tablet by mouth Daily. 30 tablet 11   • Cholecalciferol (VITAMIN D3) 2000 units tablet Take  by mouth Daily.     • docusate sodium (COLACE) 100 MG capsule Take 100 mg by mouth 2 (Two) Times a Day.     • estradiol (ESTRACE) 0.5 MG tablet Take 0.25 mg by mouth Daily.     • levothyroxine (SYNTHROID, LEVOTHROID) 112 MCG tablet Take 112 mcg by mouth Daily.     • linaclotide (Linzess) 72 MCG capsule capsule Take 1 capsule by mouth Every Morning Before Breakfast. (Patient  taking differently: Take 72 mcg by mouth Every Morning Before Breakfast. prn) 30 capsule 11   • metoprolol tartrate (LOPRESSOR) 50 MG tablet Take 0.5 tablets by mouth 2 (Two) Times a Day. 1/2 tab twice a day 60 tablet 6   • nitroglycerin (NITROSTAT) 0.4 MG SL tablet Place 0.4 mg under the tongue Every 5 (Five) Minutes As Needed for chest pain. Take no more than 3 doses in 15 minutes.     • omeprazole (priLOSEC) 40 MG capsule Take 40 mg by mouth 2 (Two) Times a Day.     • sucralfate (CARAFATE) 1 G tablet Take 1 g by mouth 4 (four) times a day.     • amiodarone (Pacerone) 100 MG tablet Take 1 tablet by mouth Daily. 30 tablet 6     No current facility-administered medications for this visit.        Allergies  :  Sulfa antibiotics and Penicillins       Past Medical History:   Diagnosis Date   • Abnormal US (ultrasound) of abdomen 11/22/2010    US abdomen. No gallstones. 1.8cm benign cyst right kidney   • Abnormal US (ultrasound) of abdomen 11/22/2010    No gallstones. 1.8cm benign cyst right kidney.   • Anxiety and depression    • Celiac disease    • H/O: hysterectomy    • History of appendectomy    • History of basal cell cancer     removed   • Hyperlipidemia    • Hyperthyroidism    • Optional surgery     Adhesions removed as a child       Social History     Socioeconomic History   • Marital status:      Spouse name: Not on file   • Number of children: Not on file   • Years of education: Not on file   • Highest education level: Not on file   Tobacco Use   • Smoking status: Never Smoker   • Smokeless tobacco: Never Used   Substance and Sexual Activity   • Alcohol use: No   • Drug use: No   • Sexual activity: Defer       Family History   Problem Relation Age of Onset   • Heart disease Father    • Stroke Father    • Hypertension Father    • Heart disease Other    • Stroke Other    • Colon polyps Brother        Review of Systems   Constitution: Positive for malaise/fatigue. Negative for decreased appetite.   HENT:  "Negative for congestion and sore throat.    Eyes: Negative for blurred vision.   Cardiovascular: Positive for chest pain and palpitations.   Respiratory: Negative for shortness of breath and snoring.    Endocrine: Negative for cold intolerance and heat intolerance.   Hematologic/Lymphatic: Negative for adenopathy. Does not bruise/bleed easily.   Skin: Negative for itching, nail changes and skin cancer.   Musculoskeletal: Negative for arthritis and myalgias.   Gastrointestinal: Negative for abdominal pain, dysphagia and heartburn.   Genitourinary: Negative for bladder incontinence and frequency.   Neurological: Negative for dizziness, light-headedness, seizures and vertigo.   Psychiatric/Behavioral: Negative for altered mental status.   Allergic/Immunologic: Negative for environmental allergies and hives.       Diabetes- No  Thyroid- normal    Objective     /60   Pulse 59   Temp 98.4 °F (36.9 °C)   Ht 175.3 cm (69\")   Wt 78.5 kg (173 lb)   BMI 25.55 kg/m²     Constitutional:       Appearance: Healthy appearance.   Eyes:      Pupils: Pupils are equal, round, and reactive to light.   HENT:      Head: Normocephalic.   Neck:      Musculoskeletal: Normal range of motion and neck supple.   Pulmonary:      Breath sounds: Examination of the right-lower field reveals decreased breath sounds. Examination of the left-lower field reveals decreased breath sounds. Decreased breath sounds present.   Cardiovascular:      Bradycardia present. Regular rhythm.      Murmurs: There is a grade 3/6 mid frequency midsystolic murmur at the apex.   Abdominal:      General: Bowel sounds are normal.      Palpations: Abdomen is soft.   Musculoskeletal: Normal range of motion.   Skin:     General: Skin is warm.   Neurological:      Mental Status: Alert and oriented to person, place, and time.         ECG 12 Lead    Date/Time: 9/14/2020 4:26 PM  Performed by: Kris Bourgeois MD  Authorized by: Kris Bourgeois MD   Comparison: " compared with previous ECG from 9/8/2019  Comparison to previous ECG: Rate is slower  Rhythm: sinus bradycardia  Rate: bradycardic  Conduction: incomplete right bundle branch block  QRS axis: normal  Other findings: T wave abnormality    Clinical impression: abnormal EKG                    Assessment/Plan   Patient's Body mass index is 25.55 kg/m². BMI is within normal parameters. No follow-up required.Kai Bender was seen today for follow-up, procedure, chest pain, med refill and palpitations.    Diagnoses and all orders for this visit:    Anomalous origin of right coronary artery    Palpitations    PAF (paroxysmal atrial fibrillation) (CMS/[facility])  -     metoprolol tartrate (LOPRESSOR) 50 MG tablet; Take 0.5 tablets by mouth 2 (Two) Times a Day. 1/2 tab twice a day  -     amiodarone (Pacerone) 100 MG tablet; Take 1 tablet by mouth Daily.  -     apixaban (ELIQUIS) 5 MG tablet tablet; Take 1 tablet by mouth Every 12 (Twelve) Hours.    Essential hypertension  -     metoprolol tartrate (LOPRESSOR) 50 MG tablet; Take 0.5 tablets by mouth 2 (Two) Times a Day. 1/2 tab twice a day    Hypercholesteremia  -     atorvastatin (LIPITOR) 20 MG tablet; Take 1 tablet by mouth Daily.    Hx of pleural effusion  -     XR Chest PA & Lateral; Future       At baseline she is bradycardic, blood pressure is normal.  Her EKG shows sinus bradycardia with nonspecific T wave changes.  Her clinical examination reveals a BMI around 25.  She does 3/6 systolic murmur at the mitral area.  She has no pedal edema.    Regarding the anomalous origin of the right coronary artery, it seems to be corrected surgically.  She is not having the anginal symptoms anymore.    Regarding the palpitation, it could be from the PAF itself.  I did advise her to reduce the dose of amiodarone 200 mg.  If she continues to have the palpitation then she may need a Holter or an extended monitor placed.    Regarding the PAF, continue the low-dose of beta-blockers and reduced  dose of amiodarone.  Continue the Eliquis at least for the next 6 months.    Regarding her blood pressure, it seems to be controlled with the beta-blockers will continue the same    Regarding her hypercholesterolemia, continue the Lipitor.  During her next labs, I will check her lipids and the liver function test    Regarding her pleural effusion, she does have mildly diminished air entry at the base.  She will get an x-ray chest.  If there is significant effusion, then she may need a CT scan.    Reassurance about her cardiac status was given.  I will see her back in 6 months or sooner if needed.                  Electronically signed by Kris Bourgeois MD September 14, 2020 16:20 EDT

## 2020-09-24 ENCOUNTER — OFFICE VISIT (OUTPATIENT)
Dept: GASTROENTEROLOGY | Facility: CLINIC | Age: 65
End: 2020-09-24

## 2020-09-24 VITALS
BODY MASS INDEX: 25.4 KG/M2 | HEART RATE: 58 BPM | TEMPERATURE: 97 F | WEIGHT: 172 LBS | DIASTOLIC BLOOD PRESSURE: 57 MMHG | SYSTOLIC BLOOD PRESSURE: 111 MMHG

## 2020-09-24 DIAGNOSIS — K21.9 GASTROESOPHAGEAL REFLUX DISEASE WITHOUT ESOPHAGITIS: Primary | ICD-10-CM

## 2020-09-24 DIAGNOSIS — K90.0 CELIAC DISEASE: ICD-10-CM

## 2020-09-24 PROCEDURE — 99213 OFFICE O/P EST LOW 20 MIN: CPT | Performed by: INTERNAL MEDICINE

## 2020-09-24 NOTE — PROGRESS NOTES
PCP:  Carissa Montejo, APRN     No referring provider defined for this encounter.    Chief Complaint   Patient presents with   • Follow-up        HPI   Patient is here for follow-up.  She is actually doing fairly well.  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  This was done at [facility] by Dr. Esteban.  This has resolved her atypical chest pain.  She is on Eliquis at this point.  She did have a brief interval of atrial fibrillation was converted with cardioversion.  She also carries a history of celiac disease.  No new problems have developed.  She did have an upper endoscopy on 12/17/2018 which showed biopsies suggestive of celiac disease.  Her colonoscopy on 4/9/2019 was normal except internal hemorrhoids.    Allergies   Allergen Reactions   • Sulfa Antibiotics Swelling   • Penicillins Rash          Current Outpatient Medications:   •  amiodarone (Pacerone) 100 MG tablet, Take 1 tablet by mouth Daily., Disp: 30 tablet, Rfl: 6  •  apixaban (ELIQUIS) 5 MG tablet tablet, Take 1 tablet by mouth Every 12 (Twelve) Hours., Disp: 60 tablet, Rfl: 6  •  aspirin 81 MG EC tablet, Take 81 mg by mouth daily., Disp: , Rfl:   •  atorvastatin (LIPITOR) 20 MG tablet, Take 1 tablet by mouth Daily., Disp: 30 tablet, Rfl: 11  •  Cholecalciferol (VITAMIN D3) 2000 units tablet, Take  by mouth Daily., Disp: , Rfl:   •  docusate sodium (COLACE) 100 MG capsule, Take 100 mg by mouth 2 (Two) Times a Day., Disp: , Rfl:   •  estradiol (ESTRACE) 0.5 MG tablet, Take 0.25 mg by mouth Daily., Disp: , Rfl:   •  levothyroxine (SYNTHROID, LEVOTHROID) 112 MCG tablet, Take 112 mcg by mouth Daily., Disp: , Rfl:   •  linaclotide (Linzess) 72 MCG capsule capsule, Take 1 capsule by mouth Every Morning Before Breakfast. (Patient taking differently: Take 72 mcg by mouth Every Morning Before Breakfast. prn), Disp: 30 capsule, Rfl: 11  •  metoprolol tartrate (LOPRESSOR) 50 MG tablet, Take 0.5 tablets by mouth 2  (Two) Times a Day. 1/2 tab twice a day, Disp: 60 tablet, Rfl: 6  •  nitroglycerin (NITROSTAT) 0.4 MG SL tablet, Place 0.4 mg under the tongue Every 5 (Five) Minutes As Needed for chest pain. Take no more than 3 doses in 15 minutes., Disp: , Rfl:   •  omeprazole (priLOSEC) 40 MG capsule, Take 40 mg by mouth 2 (Two) Times a Day., Disp: , Rfl:   •  sucralfate (CARAFATE) 1 G tablet, Take 1 g by mouth 4 (four) times a day., Disp: , Rfl:      Past Medical History:   Diagnosis Date   • Abnormal US (ultrasound) of abdomen 11/22/2010    US abdomen. No gallstones. 1.8cm benign cyst right kidney   • Abnormal US (ultrasound) of abdomen 11/22/2010    No gallstones. 1.8cm benign cyst right kidney.   • Anxiety and depression    • Celiac disease    • H/O: hysterectomy    • History of appendectomy    • History of basal cell cancer     removed   • Hyperlipidemia    • Hyperthyroidism    • Optional surgery     Adhesions removed as a child       Past Surgical History:   Procedure Laterality Date   • APPENDECTOMY     • CARDIAC CATHETERIZATION  10/25/2013    Anomalous origin of RCA, normal coronaries, EF65%   • CARDIAC CATHETERIZATION  02/18/2020    RCA arising from (L) Coronary Cusp.   • CARDIOVASCULAR STRESS TEST  03/14/2012    Stress- 9min, 85% THR./72, negative for ischemia   • CARDIOVASCULAR STRESS TEST  10/24/2013    L.Myoview- ([facility]) small apical infarct versus breast attenuation   • CARDIOVASCULAR STRESS TEST  11/15/2018    L. Cardiolite- Negative. EF 76%.   • CARDIOVASCULAR STRESS TEST  09/17/2019    L. Cardiolite- EF > 70%. Negative.   • CHOLECYSTECTOMY     • CORONARY ARTERY BYPASS GRAFT  07/20/2020    Dr. Esteban- Reimplant of RCA   • ECHO - CONVERTED  09/18/2010    Echo- ([facility]. Dr. Barr) EF 65%   • ECHO - CONVERTED  09/18/2010    D. Echo- ([facility]. Dr. Barr) Negative.   • ECHO - CONVERTED  03/14/2012    Echo- EF 65-70%   • ECHO - CONVERTED  11/15/2018    EF 55-60%. Mild MR.   • ECHO - CONVERTED  09/17/2019    EF 65%.  Trace-Mild MR. RVSP- 22 mmHg.   • HYSTERECTOMY     • OTHER SURGICAL HISTORY  02/20/2020    CTA Heart- Anamalous origina nd Course of RCA. Between Aorta and PA        Social History     Socioeconomic History   • Marital status:      Spouse name: Not on file   • Number of children: Not on file   • Years of education: Not on file   • Highest education level: Not on file   Tobacco Use   • Smoking status: Never Smoker   • Smokeless tobacco: Never Used   Substance and Sexual Activity   • Alcohol use: No   • Drug use: No   • Sexual activity: Defer        Family History   Problem Relation Age of Onset   • Heart disease Father    • Stroke Father    • Hypertension Father    • Heart disease Other    • Stroke Other    • Colon polyps Brother         Review of Systems   Constitutional: Negative.    HENT: Negative for trouble swallowing and voice change.    Gastrointestinal: Negative.         Vitals:    09/24/20 1438   BP: 111/57   Pulse: 58   Temp: 97 °F (36.1 °C)        Physical Exam   General Appearance: Alert, in no acute distress   Head: Normocephalic, without obvious abnormality, atraumatic   Eyes: Lids and lashes normal, conjunctivae and sclerae normal, no icterus, no pallor, corneas clear, PERRLA   Chest Wall: Symmetrical respiratory expansion   Abdomen: No masses, no organomegaly, soft non-tender, non-distended, no guarding, no rebound tenderness   Extremities: Moves all extremities well, no edema, no cyanosis, no redness   Skin: No bleeding, bruising or rash   Neurologic: Cranial nerves 2 - 12 grossly intact, no focal deficits     Review of systems was reviewed and positives are noted. All of the remaining review of systems in that system are negative.    Yulia was seen today for follow-up.    Diagnoses and all orders for this visit:    Gastroesophageal reflux disease without esophagitis    Celiac disease    Impressions and plan #1 celiac disease: I would suggest continuing a gluten-free diet at this point.  She  will call us with issues.    #2 gastroesophageal reflux disease: She seems to be fairly well controlled on the omeprazole 40 mg a day.  She will contact us with troubles.    Ernesto Mirza MD

## 2020-09-29 ENCOUNTER — TELEPHONE (OUTPATIENT)
Dept: CARDIOLOGY | Facility: CLINIC | Age: 65
End: 2020-09-29

## 2020-09-29 NOTE — TELEPHONE ENCOUNTER
Pt TUCKER, she had went to the ER yesterday for chest pain. Was advised that it was chest wall pain from the surgery. They said to let you review the notes and see if you agree with diagnosis. Discharge summary is under media.

## 2021-02-25 DIAGNOSIS — Z23 IMMUNIZATION DUE: ICD-10-CM

## 2021-03-01 ENCOUNTER — OUTSIDE FACILITY SERVICE (OUTPATIENT)
Dept: CARDIOLOGY | Facility: CLINIC | Age: 66
End: 2021-03-01

## 2021-03-01 PROCEDURE — 78452 HT MUSCLE IMAGE SPECT MULT: CPT | Performed by: INTERNAL MEDICINE

## 2021-03-01 PROCEDURE — 99215 OFFICE O/P EST HI 40 MIN: CPT | Performed by: INTERNAL MEDICINE

## 2021-03-01 PROCEDURE — 93306 TTE W/DOPPLER COMPLETE: CPT | Performed by: INTERNAL MEDICINE

## 2021-03-01 PROCEDURE — 93018 CV STRESS TEST I&R ONLY: CPT | Performed by: INTERNAL MEDICINE

## 2021-03-02 ENCOUNTER — OUTSIDE FACILITY SERVICE (OUTPATIENT)
Dept: CARDIOLOGY | Facility: CLINIC | Age: 66
End: 2021-03-02

## 2021-03-02 PROCEDURE — 99214 OFFICE O/P EST MOD 30 MIN: CPT | Performed by: INTERNAL MEDICINE

## 2021-03-16 ENCOUNTER — OFFICE VISIT (OUTPATIENT)
Dept: CARDIOLOGY | Facility: CLINIC | Age: 66
End: 2021-03-16

## 2021-03-16 VITALS
SYSTOLIC BLOOD PRESSURE: 128 MMHG | DIASTOLIC BLOOD PRESSURE: 80 MMHG | HEART RATE: 52 BPM | HEIGHT: 69 IN | WEIGHT: 183.2 LBS | TEMPERATURE: 97.5 F | BODY MASS INDEX: 27.13 KG/M2

## 2021-03-16 DIAGNOSIS — Q24.5 ANOMALOUS ORIGIN OF RIGHT CORONARY ARTERY: ICD-10-CM

## 2021-03-16 DIAGNOSIS — E78.00 HYPERCHOLESTEREMIA: ICD-10-CM

## 2021-03-16 DIAGNOSIS — I48.0 PAF (PAROXYSMAL ATRIAL FIBRILLATION) (HCC): ICD-10-CM

## 2021-03-16 DIAGNOSIS — T50.905A BRADYCARDIA, DRUG INDUCED: Primary | ICD-10-CM

## 2021-03-16 DIAGNOSIS — I10 ESSENTIAL HYPERTENSION: ICD-10-CM

## 2021-03-16 DIAGNOSIS — R00.1 BRADYCARDIA, DRUG INDUCED: Primary | ICD-10-CM

## 2021-03-16 DIAGNOSIS — E66.3 OVERWEIGHT: ICD-10-CM

## 2021-03-16 PROCEDURE — 99214 OFFICE O/P EST MOD 30 MIN: CPT | Performed by: NURSE PRACTITIONER

## 2021-03-16 PROCEDURE — 93000 ELECTROCARDIOGRAM COMPLETE: CPT | Performed by: NURSE PRACTITIONER

## 2021-03-16 RX ORDER — AMIODARONE HYDROCHLORIDE 100 MG/1
100 TABLET ORAL DAILY
Qty: 30 TABLET | Refills: 8 | Status: SHIPPED | OUTPATIENT
Start: 2021-03-16 | End: 2021-09-23 | Stop reason: HOSPADM

## 2021-03-16 RX ORDER — METOPROLOL TARTRATE 50 MG/1
25 TABLET, FILM COATED ORAL 2 TIMES DAILY
Qty: 60 TABLET | Refills: 8 | Status: SHIPPED | OUTPATIENT
Start: 2021-03-16 | End: 2021-09-23 | Stop reason: SDUPTHER

## 2021-03-16 RX ORDER — ATORVASTATIN CALCIUM 20 MG/1
20 TABLET, FILM COATED ORAL DAILY
Qty: 30 TABLET | Refills: 8 | Status: SHIPPED | OUTPATIENT
Start: 2021-03-16 | End: 2021-09-23 | Stop reason: SDUPTHER

## 2021-03-16 NOTE — PROGRESS NOTES
Chief Complaint   Patient presents with   • Follow-up     cardiac management. pt is on aspirin. had a hospital ER visit 2/28 due to chest pain. she thought it was related to open heart surgery. echo and stress on 3/1 in chart.    • Med Refill     will need cardiac meds refilled 90 days to [facility] in [place]   • Labs     recent labs drawn from her ER visit 2/28, not in chart.       Cardiac Complaints  none      Subjective   Yulia Abebe is a 65 y.o. 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  She has been tried manage medically with increasing beta-blockers and nitrates but the symptoms persisted.  She was referred to the CT surgery service at .  She underwent surgery with reimplanting the RCA to anterior part of the aorta.  Her postoperative course was complicated with PAF for which she underwent JESSY and cardioversion.  She was discharged home on amiodarone and Eliquis.  Patient went to the hospital at [facility] March 2021 with chest pain and weakness. She underwent EKG at that time which showed no acute ST changes, troponin was negative. Stress and echo were recommended. Stress showed no ischemia with good LV function, no effusion noted.  Patient was advised chest pain appeared to be more GI in nature and was advised to follow with GI for EGD.     She comes today for follow up and reports done well since being in the hospital. She denies chest pain, SOA, palpitations, dizziness, and syncope. Labs have been followed by PCP, no current are available, and were also checked at hospital in March 2021. Refills are requested.      Cardiac History  Past Surgical History:   Procedure Laterality Date   • APPENDECTOMY     • CARDIAC CATHETERIZATION  10/25/2013    Anomalous origin of RCA, normal coronaries, EF65%   • CARDIAC CATHETERIZATION  02/18/2020    RCA arising from (L) Coronary Cusp.   • CARDIOVASCULAR  STRESS TEST  03/14/2012    Stress- 9min, 85% THR./72, negative for ischemia   • CARDIOVASCULAR STRESS TEST  10/24/2013    L.Myoview- ([facility]) small apical infarct versus breast attenuation   • CARDIOVASCULAR STRESS TEST  11/15/2018    L. Cardiolite- Negative. EF 76%.   • CARDIOVASCULAR STRESS TEST  09/17/2019    L. Cardiolite- EF > 70%. Negative.   • CARDIOVASCULAR STRESS TEST  03/01/2021    EF 71%, no ischemia   • CHOLECYSTECTOMY     • CORONARY ARTERY BYPASS GRAFT  07/20/2020    Dr. Esteban- Reimplant of RCA   • ECHO - CONVERTED  09/18/2010    Echo- ([facility]. Dr. Barr) EF 65%   • ECHO - CONVERTED  09/18/2010    D. Echo- ([facility]. Dr. Barr) Negative.   • ECHO - CONVERTED  03/14/2012    Echo- EF 65-70%   • ECHO - CONVERTED  11/15/2018    EF 55-60%. Mild MR.   • ECHO - CONVERTED  09/17/2019    EF 65%. Trace-Mild MR. RVSP- 22 mmHg.   • ECHO - CONVERTED  03/01/2021    EF 65-70%, normal diastolic function, trace MR   • HYSTERECTOMY     • OTHER SURGICAL HISTORY  02/20/2020    CTA Heart- Anamalous origina nd Course of RCA. Between Aorta and PA       Current Outpatient Medications   Medication Sig Dispense Refill   • amiodarone (Pacerone) 100 MG tablet Take 1 tablet by mouth Daily. 30 tablet 8   • apixaban (ELIQUIS) 5 MG tablet tablet Take 1 tablet by mouth Every 12 (Twelve) Hours. 60 tablet 8   • aspirin 81 MG EC tablet Take 81 mg by mouth daily.     • atorvastatin (LIPITOR) 20 MG tablet Take 1 tablet by mouth Daily. 30 tablet 8   • Cholecalciferol (VITAMIN D3) 2000 units tablet Take 50 mcg by mouth Daily.     • docusate sodium (COLACE) 100 MG capsule Take 100 mg by mouth 2 (Two) Times a Day.     • estradiol (ESTRACE) 0.5 MG tablet Take 0.25 mg by mouth Daily.     • levothyroxine (SYNTHROID, LEVOTHROID) 112 MCG tablet Take 125 mcg by mouth Daily.     • linaclotide (Linzess) 72 MCG capsule capsule Take 1 capsule by mouth Every Morning Before Breakfast. (Patient taking differently: Take 72 mcg by mouth Every Morning Before  Breakfast. prn) 30 capsule 11   • metoprolol tartrate (LOPRESSOR) 50 MG tablet Take 0.5 tablets by mouth 2 (Two) Times a Day. 1/2 tab twice a day 60 tablet 8   • nitroglycerin (NITROSTAT) 0.4 MG SL tablet Place 0.4 mg under the tongue Every 5 (Five) Minutes As Needed for chest pain. Take no more than 3 doses in 15 minutes.     • omeprazole (priLOSEC) 40 MG capsule Take 40 mg by mouth 2 (Two) Times a Day.     • sucralfate (CARAFATE) 1 G tablet Take 1 g by mouth 4 (four) times a day.       No current facility-administered medications for this visit.       Sulfa antibiotics and Penicillins    Past Medical History:   Diagnosis Date   • Abnormal US (ultrasound) of abdomen 11/22/2010    US abdomen. No gallstones. 1.8cm benign cyst right kidney   • Abnormal US (ultrasound) of abdomen 11/22/2010    No gallstones. 1.8cm benign cyst right kidney.   • Anxiety and depression    • Celiac disease    • H/O: hysterectomy    • History of appendectomy    • History of basal cell cancer     removed   • Hyperlipidemia    • Hyperthyroidism    • Optional surgery     Adhesions removed as a child       Social History     Socioeconomic History   • Marital status:      Spouse name: Not on file   • Number of children: Not on file   • Years of education: Not on file   • Highest education level: Not on file   Tobacco Use   • Smoking status: Never Smoker   • Smokeless tobacco: Never Used   Vaping Use   • Vaping Use: Never used   Substance and Sexual Activity   • Alcohol use: No   • Drug use: No   • Sexual activity: Defer       Family History   Problem Relation Age of Onset   • Heart disease Father    • Stroke Father    • Hypertension Father    • Heart disease Other    • Stroke Other    • Colon polyps Brother        Review of Systems   Constitutional: Negative for malaise/fatigue and night sweats.   Cardiovascular: Negative for chest pain, claudication, dyspnea on exertion, irregular heartbeat, leg swelling, near-syncope, orthopnea,  "palpitations and syncope.   Respiratory: Negative for cough, shortness of breath and wheezing.    Musculoskeletal: Negative for back pain, joint pain and joint swelling.   Gastrointestinal: Positive for heartburn. Negative for anorexia, melena, nausea and vomiting.   Genitourinary: Negative for dysuria, hematuria, hesitancy and nocturia.   Neurological: Negative for dizziness, light-headedness and loss of balance.   Psychiatric/Behavioral: Negative for depression and memory loss. The patient is not nervous/anxious.            Objective     /80 (BP Location: Right arm)   Pulse 52   Temp 97.5 °F (36.4 °C)   Ht 175.3 cm (69.02\")   Wt 83.1 kg (183 lb 3.2 oz)   BMI 27.04 kg/m²     Constitutional:       Appearance: Healthy appearance. Not in distress.   Eyes:      Pupils: Pupils are equal, round, and reactive to light.   HENT:      Nose: Nose normal.   Pulmonary:      Effort: Pulmonary effort is normal.      Breath sounds: Normal breath sounds.   Cardiovascular:      PMI at left midclavicular line. Bradycardia present. Regular rhythm.      Murmurs: There is a systolic murmur.   Abdominal:      Palpations: Abdomen is soft.   Musculoskeletal: Normal range of motion.      Cervical back: Normal range of motion and neck supple. Skin:     General: Skin is warm and dry.   Neurological:      Mental Status: Oriented to person, place and time.           ECG 12 Lead    Date/Time: 3/16/2021 12:58 PM  Performed by: Martina Witt APRN  Authorized by: Martina Witt APRN   Comparison: compared with previous ECG from 9/14/2020  Similar to previous ECG  Rhythm: sinus bradycardia  BPM: 52  Conduction: incomplete right bundle branch block    Clinical impression: abnormal EKG  Comments: Normal QT            Assessment/Plan     PAF:  'EKG done today in regards shows continued sinus candelario with normal QT.  No asymptomatic bradycardia noted.  If weakness/fatigue should develop, we will lower BB therapy. She will continue on low " dose amiodarone therapy at 100mg daily.  She remains on eliquis therapy for anticoagulation, bleeding and bruising denied.    HTN:  Blood pressure stable. Same metoprolol therapy will be continued. Limited sodium advised.    Anomalous RCA:  Recent stress and echo showed no ischemia, good LV function, and no valvular concerns noted. ASA therapy will be continued at current.     Hyperlipidemia:  On statin therapy with lipitor.  FLP followed by your office, no current available. Can we have for our records?    Chest discomfort:  Improved, but still present. She will have EGD with GI again soon.    BMI noted at 27.04, good cardiac diet with limited carbs, calories, and activity as tolerated advised.    Refills per request.    6 month follow up recommended or sooner if needed.                Problems Addressed this Visit        Cardiac and Vasculature    Essential hypertension    Relevant Medications    metoprolol tartrate (LOPRESSOR) 50 MG tablet    Hypercholesteremia    Relevant Medications    atorvastatin (LIPITOR) 20 MG tablet    Anomalous origin of right coronary artery    Relevant Medications    metoprolol tartrate (LOPRESSOR) 50 MG tablet    PAF (paroxysmal atrial fibrillation) (CMS/[facility])    Relevant Medications    apixaban (ELIQUIS) 5 MG tablet tablet    amiodarone (Pacerone) 100 MG tablet    metoprolol tartrate (LOPRESSOR) 50 MG tablet    Other Relevant Orders    ECG 12 Lead      Other Visit Diagnoses     Bradycardia, drug induced    -  Primary    Relevant Orders    ECG 12 Lead    Overweight          Diagnoses       Codes Comments    Bradycardia, drug induced    -  Primary ICD-10-CM: R00.1, T50.905A  ICD-9-CM: 427.89, E947.9     PAF (paroxysmal atrial fibrillation) (CMS/[facility])     ICD-10-CM: I48.0  ICD-9-CM: 427.31     Essential hypertension     ICD-10-CM: I10  ICD-9-CM: 401.9     Hypercholesteremia     ICD-10-CM: E78.00  ICD-9-CM: 272.0     Anomalous origin of right coronary artery     ICD-10-CM: Q24.5  ICD-9-CM:  746.85     Overweight     ICD-10-CM: E66.3  ICD-9-CM: 278.02           Patient's Body mass index is 27.04 kg/m². BMI is above normal parameters. Recommendations include: nutrition counseling.            Electronically signed by SRIKANTH Plata March 16, 2021 16:14 EDT

## 2021-03-24 ENCOUNTER — TELEPHONE (OUTPATIENT)
Dept: CARDIOLOGY | Facility: CLINIC | Age: 66
End: 2021-03-24

## 2021-03-24 NOTE — TELEPHONE ENCOUNTER
Received fax from Dr. Mirza for cardiac clearance for patient to have an EGD. Patient is on aspirin and Eliquis, unsure if needing to hold. According to our records, I do not see where patient has had any stenting. Patient had a CABG on 07/20/2020. Patient has a history of PAF.         Fax [phone]

## 2021-04-02 ENCOUNTER — OUTSIDE FACILITY SERVICE (OUTPATIENT)
Dept: GASTROENTEROLOGY | Facility: CLINIC | Age: 66
End: 2021-04-02

## 2021-04-02 PROCEDURE — 43239 EGD BIOPSY SINGLE/MULTIPLE: CPT | Performed by: INTERNAL MEDICINE

## 2021-04-05 ENCOUNTER — LAB REQUISITION (OUTPATIENT)
Dept: LAB | Facility: HOSPITAL | Age: 66
End: 2021-04-05

## 2021-04-05 DIAGNOSIS — R07.89 OTHER CHEST PAIN: ICD-10-CM

## 2021-04-05 PROCEDURE — 88305 TISSUE EXAM BY PATHOLOGIST: CPT | Performed by: INTERNAL MEDICINE

## 2021-04-06 LAB
CYTO UR: NORMAL
LAB AP CASE REPORT: NORMAL
LAB AP CLINICAL INFORMATION: NORMAL
PATH REPORT.FINAL DX SPEC: NORMAL
PATH REPORT.GROSS SPEC: NORMAL

## 2021-09-08 NOTE — PROGRESS NOTES
Chief Complaint   Patient presents with   • Hospital follow up     cath, CTA , still having some heaviness but no chest pain like before.    • Fatigue     more fatigue than normal   • Bronchitis     recently diagnosed by PCP, stopped her antibiotic due to diarrhea   • Diarrhea     stopped the Bentyl due to diarrhea, pt wasn't sure if virus or just the meds.        CARDIAC COMPLAINTS  chest pressure/discomfort, dyspnea and fatigue        Subjective   Yulia Abebe is a 64 y.o. female came in today for her regular follow-up visit.  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  She has been having increasing anginal symptoms and the stress test did not show any changes.  Since the symptoms got worse she did undergo cardiac catheterization which did confirm the anomalous origin of the RCA.  Because of the worsening of the symptoms I also had a CTA done and found that the RCA is arising from the left coronary cusp but the course is also abnormal where it passes between the aorta and the PA.  She was put on a low-dose of beta-blockers after which the symptoms of chest tightness is better but still has some occasional episodes.  She is not having any palpitation.  She did have some diarrhea with Bentyl so it was stopped.  She also complained of having problems at home.  She is under a lot of depression.              Cardiac History  Past Surgical History:   Procedure Laterality Date   • APPENDECTOMY     • CARDIAC CATHETERIZATION  10/25/2013    Anomalous origin of RCA, normal coronaries, EF65%   • CARDIAC CATHETERIZATION  02/18/2020    RCA arising from (L) Coronary Cusp.   • CARDIOVASCULAR STRESS TEST  03/14/2012    Stress- 9min, 85% THR./72, negative for ischemia   • CARDIOVASCULAR STRESS TEST  10/24/2013    L.Myoview- ([facility]) small apical infarct versus breast attenuation   • CARDIOVASCULAR STRESS TEST  11/15/2018    L. Cardiolite-  Dr. Melissa Dunn at bedside speaking to patient and spouse regarding the need for CABG. Patient agreeable to schedule surgery at this time. PAT scheduled for 9/9/2021 @ 0845 with a 0800 arrival and surgery scheduled for 9/10/2021 @ 0700. Cath lab holding nurses notified of schedule and orders for CT of chest without contrast and echo of heart. Patient left watching the preparing for heart surgery video. Negative. EF 76%.   • CARDIOVASCULAR STRESS TEST  09/17/2019    L. Cardiolite- EF > 70%. Negative.   • CHOLECYSTECTOMY     • [facility]  [facility]18[facility]2010    [facility]   • ECHO - CONVERTED  09/18/2010    [facility] Negative.   • ECHO - CONVERTED  03/14/2012    Echo- EF 65-70%   • ECHO - CONVERTED  11/15/2018    EF 55-60%. Mild MR.   • ECHO - CONVERTED  09/17/2019    EF 65%. Trace-Mild MR. RVSP- 22 mmHg.   • HYSTERECTOMY     • OTHER SURGICAL HISTORY  02/20/2020    CTA Heart- Anamalous origina nd Course of RCA. Between Aorta and PA       Current Outpatient Medications   Medication Sig Dispense Refill   • aspirin 81 MG EC tablet Take 81 mg by mouth daily.     • atorvastatin (LIPITOR) 10 MG tablet Take 10 mg by mouth Daily.     • Cholecalciferol (VITAMIN D3) 2000 units tablet Take  by mouth Daily.     • clidinium-chlordiazepoxide (LIBRAX) 5-2.5 MG per capsule Take 1 capsule by mouth Every 6 (Six) Hours.     • estradiol (ESTRACE) 0.5 MG tablet Take 0.25 mg by mouth Daily.     • levothyroxine (SYNTHROID, LEVOTHROID) 112 MCG tablet Take 112 mcg by mouth Daily.     • linaclotide (LINZESS) 72 MCG capsule capsule Take 1 capsule by mouth Every Morning Before Breakfast. 30 capsule 11   • metoprolol tartrate (LOPRESSOR) 25 MG tablet Take 1 tablet by mouth 2 (Two) Times a Day. 60 tablet 11   • Multiple Vitamins-Minerals (MULTIVITAMIN WITH MINERALS) tablet tablet Take 1 tablet by mouth Daily.     • nitroglycerin (NITROSTAT) 0.4 MG SL tablet Place 0.4 mg under the tongue Every 5 (Five) Minutes As Needed for chest pain. Take no more than 3 doses in 15 minutes.     • omeprazole (priLOSEC) 40 MG capsule Take 40 mg by mouth 2 (Two) Times a Day.     • sucralfate (CARAFATE) 1 G tablet Take 1 g by mouth 4 (four) times a day.       No current facility-administered medications for this visit.        Allergies  :  Sulfa antibiotics and Penicillins       Past Medical History:   Diagnosis Date   • Abnormal  US (ultrasound) of abdomen 11/22/2010    US abdomen. No gallstones. 1.8cm benign cyst right kidney   • Abnormal US (ultrasound) of abdomen 11/22/2010    No gallstones. 1.8cm benign cyst right kidney.   • Anxiety and depression    • Celiac disease    • H/O: hysterectomy    • History of appendectomy    • History of basal cell cancer     removed   • Hyperlipidemia    • Hyperthyroidism    • Optional surgery     Adhesions removed as a child       Social History     Socioeconomic History   • Marital status:      Spouse name: Not on file   • Number of children: Not on file   • Years of education: Not on file   • Highest education level: Not on file   Tobacco Use   • Smoking status: Never Smoker   • Smokeless tobacco: Never Used   Substance and Sexual Activity   • Alcohol use: No   • Drug use: No   • Sexual activity: Defer       Family History   Problem Relation Age of Onset   • Heart disease Father    • Stroke Father    • Hypertension Father    • Heart disease Other    • Stroke Other    • Colon polyps Brother        Review of Systems   Constitution: Positive for malaise/fatigue. Negative for decreased appetite.   HENT: Negative for congestion and sore throat.    Eyes: Negative for blurred vision.   Cardiovascular: Positive for chest pain. Negative for palpitations.   Respiratory: Positive for shortness of breath. Negative for snoring.    Endocrine: Negative for cold intolerance and heat intolerance.   Hematologic/Lymphatic: Negative for adenopathy. Does not bruise/bleed easily.   Skin: Negative for itching, nail changes and skin cancer.   Musculoskeletal: Negative for arthritis and myalgias.   Gastrointestinal: Negative for abdominal pain, dysphagia and heartburn.   Genitourinary: Negative for bladder incontinence and frequency.   Neurological: Negative for dizziness, light-headedness, seizures and vertigo.   Psychiatric/Behavioral: Positive for depression. Negative for altered mental status. The patient is  "nervous/anxious.    Allergic/Immunologic: Negative for environmental allergies and hives.       Diabetes- No  Thyroid- abnormal    Objective     /78   Pulse 76   Ht 175.3 cm (69\")   Wt 83.5 kg (184 lb)   BMI 27.17 kg/m²     Physical Exam   Constitutional: She is oriented to person, place, and time. She appears well-developed and well-nourished.   HENT:   Head: Normocephalic.   Nose: Nose normal.   Eyes: Pupils are equal, round, and reactive to light. EOM are normal.   Neck: Normal range of motion. Neck supple.   Cardiovascular: Normal rate, regular rhythm, S1 normal and S2 normal.   Murmur heard.  Pulmonary/Chest: Effort normal and breath sounds normal.   Abdominal: Soft. Bowel sounds are normal.   Musculoskeletal: Normal range of motion. She exhibits no edema.   Neurological: She is alert and oriented to person, place, and time.   Skin: Skin is warm and dry.   Psychiatric: She has a normal mood and affect.     Procedures            Assessment/Plan   Patient's Body mass index is 27.17 kg/m². BMI is above normal parameters. Recommendations include: nutrition counseling.     Yulia was seen today for hospital follow up, fatigue, bronchitis and diarrhea.    Diagnoses and all orders for this visit:    Essential hypertension    Mixed hyperlipidemia    Gastroesophageal reflux disease without esophagitis    Anomalous origin of right coronary artery    Chest heaviness    Anxiety    Other specified hypothyroidism       At baseline her heart rate and blood pressure appears stable.  Her BMI is still around 27.  Her clinical examination is unremarkable other than the short systolic murmur at the mitral area.    Regarding her blood pressure at this time it is very well controlled with low-dose of beta-blockers and will continue the same.    Regarding her hypercholesterolemia, she is tolerating the Lipitor so we will continue the same.    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  I did explain to her that there is an increased risk of sudden cardiac arrest but it is occurs mostly in younger people.  At her age the likelihood of sudden cardiac arrest is low.  I also talked to her about the surgery needed will be at high risk for her.  At this time continuing the low-dose of beta-blockers will be better.    Regarding her reflux since she is not able to tolerate the Bentyl, continue with Prilosec and Carafate.    Regarding her anxiety, she is advised to talk to you about it.    Overall cardiac status appears stable.  I talked to her about diet and weight reduction.  I will see her back in 6 months or sooner if needed.                  Electronically signed by Kris Bourgeois MD March 2, 2020 4:36 PM

## 2021-09-23 ENCOUNTER — OFFICE VISIT (OUTPATIENT)
Dept: CARDIOLOGY | Facility: CLINIC | Age: 66
End: 2021-09-23

## 2021-09-23 VITALS
HEART RATE: 55 BPM | WEIGHT: 180 LBS | HEIGHT: 69 IN | DIASTOLIC BLOOD PRESSURE: 84 MMHG | SYSTOLIC BLOOD PRESSURE: 122 MMHG | BODY MASS INDEX: 26.66 KG/M2

## 2021-09-23 DIAGNOSIS — R00.1 BRADYCARDIA, DRUG INDUCED: ICD-10-CM

## 2021-09-23 DIAGNOSIS — T50.905A BRADYCARDIA, DRUG INDUCED: ICD-10-CM

## 2021-09-23 DIAGNOSIS — E78.00 HYPERCHOLESTEREMIA: ICD-10-CM

## 2021-09-23 DIAGNOSIS — I48.0 PAF (PAROXYSMAL ATRIAL FIBRILLATION) (HCC): Primary | ICD-10-CM

## 2021-09-23 DIAGNOSIS — I10 ESSENTIAL HYPERTENSION: ICD-10-CM

## 2021-09-23 DIAGNOSIS — E66.3 OVERWEIGHT: ICD-10-CM

## 2021-09-23 DIAGNOSIS — L76.82 INCISIONAL PAIN: ICD-10-CM

## 2021-09-23 DIAGNOSIS — R07.89 CHEST PRESSURE: ICD-10-CM

## 2021-09-23 DIAGNOSIS — R06.02 SHORTNESS OF BREATH: ICD-10-CM

## 2021-09-23 PROCEDURE — 99214 OFFICE O/P EST MOD 30 MIN: CPT | Performed by: NURSE PRACTITIONER

## 2021-09-23 PROCEDURE — 93000 ELECTROCARDIOGRAM COMPLETE: CPT | Performed by: NURSE PRACTITIONER

## 2021-09-23 RX ORDER — ATORVASTATIN CALCIUM 20 MG/1
20 TABLET, FILM COATED ORAL DAILY
Qty: 30 TABLET | Refills: 8 | Status: SHIPPED | OUTPATIENT
Start: 2021-09-23 | End: 2022-03-31 | Stop reason: SDUPTHER

## 2021-09-23 RX ORDER — LEVOTHYROXINE SODIUM 0.12 MG/1
125 TABLET ORAL DAILY
COMMUNITY

## 2021-09-23 RX ORDER — METOPROLOL TARTRATE 50 MG/1
25 TABLET, FILM COATED ORAL 2 TIMES DAILY
Qty: 60 TABLET | Refills: 8 | Status: SHIPPED | OUTPATIENT
Start: 2021-09-23 | End: 2022-03-31 | Stop reason: SDUPTHER

## 2021-09-23 NOTE — PROGRESS NOTES
Chief Complaint   Patient presents with   • Follow-up     For cardiac management. Patient is on aspirin. Last lab work was done about 1 month ago per PCP, not in chart. States that she still does have a pins and needles feeling in her chest. States that she did have a sharp pain the other day in her chest when she raised her arm to brush her hair. States that he has shortness of breath quite a bit. States that she occasionally has dizziness.    • Med Refill     Needs refills on cardiac medications. 90 day supplies to [facility]. Brought medications with visit.        Cardiac Complaints  chest pressure/discomfort and dyspnea      Subjective   Yulia Abebe is a 66 y.o. 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  She has been tried manage medically with increasing beta-blockers and nitrates but the symptoms persisted.  She was referred to the CT surgery service at .  She underwent surgery with reimplanting the RCA to anterior part of the aorta.  Her postoperative course was complicated with PAF for which she underwent JESSY and cardioversion.  She was discharged home on amiodarone and Eliquis.  Patient went to the hospital at [facility] March 2021 with chest pain and weakness. She underwent EKG at that time which showed no acute ST changes, troponin was negative. Stress and echo were recommended. Stress showed no ischemia with good LV function, no effusion noted.  Patient was advised chest pain appeared to be more GI in nature and was advised to follow with GI for EGD. Most recent EGD showed hernia in April 2021, she is followed by Dr. Mirza. She did have more chest pain at last visit and cardiac workup was advised. Stress showed EF of 70%, and no ischemia. Echo showed EF 65-70%, no diastolic dysfunction.     She returns today for follow up and denies any new concerns. She does continue to have musculoskeletal  pain when she moves from one position to the other. She also reports sensation of pins and needles in her chest, after incision.  She does report some issues with pain when she raises her hand to comb it, but admits it goes away when she puts her arm down. She does shortness of breath, but she has had this for sometime, no worse than prior. Refills with PCP, done a month ago, no current available. Refills requested.           Cardiac History  Past Surgical History:   Procedure Laterality Date   • APPENDECTOMY     • CARDIAC CATHETERIZATION  10/25/2013    Anomalous origin of RCA, normal coronaries, EF65%   • CARDIAC CATHETERIZATION  02/18/2020    RCA arising from (L) Coronary Cusp.   • CARDIOVASCULAR STRESS TEST  03/14/2012    Stress- 9min, 85% THR./72, negative for ischemia   • CARDIOVASCULAR STRESS TEST  10/24/2013    L.Myoview- ([facility]) small apical infarct versus breast attenuation   • CARDIOVASCULAR STRESS TEST  11/15/2018    L. Cardiolite- Negative. EF 76%.   • CARDIOVASCULAR STRESS TEST  09/17/2019    L. Cardiolite- EF > 70%. Negative.   • CARDIOVASCULAR STRESS TEST  03/01/2021    EF 71%, no ischemia   • CHOLECYSTECTOMY     • CORONARY ARTERY BYPASS GRAFT  07/20/2020    Dr. Esteban- Reimplant of RCA   • ECHO - CONVERTED  09/18/2010    Echo- ([facility]. Dr. Barr) EF 65%   • ECHO - CONVERTED  09/18/2010    D. Echo- ([facility]. Dr. Barr) Negative.   • ECHO - CONVERTED  03/14/2012    Echo- EF 65-70%   • ECHO - CONVERTED  11/15/2018    EF 55-60%. Mild MR.   • ECHO - CONVERTED  09/17/2019    EF 65%. Trace-Mild MR. RVSP- 22 mmHg.   • ECHO - CONVERTED  03/01/2021    EF 65-70%, normal diastolic function, trace MR   • HYSTERECTOMY     • OTHER SURGICAL HISTORY  02/20/2020    CTA Heart- Anamalous origina nd Course of RCA. Between Aorta and PA       Current Outpatient Medications   Medication Sig Dispense Refill   • apixaban (ELIQUIS) 5 MG tablet tablet Take 1 tablet by mouth Every 12 (Twelve) Hours. 60 tablet 8   • aspirin 81 MG  EC tablet Take 81 mg by mouth daily.     • atorvastatin (LIPITOR) 20 MG tablet Take 1 tablet by mouth Daily. 30 tablet 8   • Cholecalciferol (VITAMIN D3) 2000 units tablet Take 50 mcg by mouth Daily.     • docusate sodium (COLACE) 100 MG capsule Take 100 mg by mouth 2 (Two) Times a Day.     • estradiol (ESTRACE) 0.5 MG tablet Take 0.25 mg by mouth Daily.     • levothyroxine (SYNTHROID, LEVOTHROID) 125 MCG tablet Take 125 mcg by mouth Daily.     • linaclotide (Linzess) 72 MCG capsule capsule Take 1 capsule by mouth Every Morning Before Breakfast. (Patient taking differently: Take 72 mcg by mouth Every Morning Before Breakfast. prn) 30 capsule 11   • metoprolol tartrate (LOPRESSOR) 50 MG tablet Take 0.5 tablets by mouth 2 (Two) Times a Day. 1/2 tab twice a day 60 tablet 8   • nitroglycerin (NITROSTAT) 0.4 MG SL tablet Place 0.4 mg under the tongue Every 5 (Five) Minutes As Needed for chest pain. Take no more than 3 doses in 15 minutes.     • omeprazole (priLOSEC) 40 MG capsule Take 40 mg by mouth 2 (Two) Times a Day.     • sucralfate (CARAFATE) 1 G tablet Take 1 g by mouth 4 (four) times a day.       No current facility-administered medications for this visit.       Sulfa antibiotics and Penicillins    Past Medical History:   Diagnosis Date   • Abnormal US (ultrasound) of abdomen 11/22/2010    US abdomen. No gallstones. 1.8cm benign cyst right kidney   • Abnormal US (ultrasound) of abdomen 11/22/2010    No gallstones. 1.8cm benign cyst right kidney.   • Anxiety and depression    • Celiac disease    • H/O: hysterectomy    • History of appendectomy    • History of basal cell cancer     removed   • Hyperlipidemia    • Hyperthyroidism    • Optional surgery     Adhesions removed as a child       Social History     Socioeconomic History   • Marital status:      Spouse name: Not on file   • Number of children: Not on file   • Years of education: Not on file   • Highest education level: Not on file   Tobacco Use   •  "Smoking status: Never Smoker   • Smokeless tobacco: Never Used   Vaping Use   • Vaping Use: Never used   Substance and Sexual Activity   • Alcohol use: No   • Drug use: No   • Sexual activity: Defer       Family History   Problem Relation Age of Onset   • Heart disease Father    • Stroke Father    • Hypertension Father    • Heart disease Other    • Stroke Other    • Colon polyps Brother        Review of Systems   Constitutional: Negative for malaise/fatigue and night sweats.   Cardiovascular: Positive for chest pain and dyspnea on exertion. Negative for claudication, irregular heartbeat, leg swelling, near-syncope, orthopnea and syncope.   Respiratory: Positive for shortness of breath. Negative for cough and wheezing.    Musculoskeletal: Positive for stiffness. Negative for back pain and joint pain.   Gastrointestinal: Negative for anorexia, heartburn, melena, nausea and vomiting.   Genitourinary: Negative for dysuria, hematuria, hesitancy and nocturia.   Neurological: Positive for paresthesias. Negative for dizziness, light-headedness and loss of balance.   Psychiatric/Behavioral: Negative for depression and memory loss. The patient is nervous/anxious.            Objective     /84 (BP Location: Left arm)   Pulse 55   Ht 175.3 cm (69.02\")   Wt 81.6 kg (180 lb)   BMI 26.57 kg/m²     Constitutional:       Appearance: Not in distress.   Eyes:      Pupils: Pupils are equal, round, and reactive to light.   HENT:      Nose: Nose normal.   Pulmonary:      Effort: Pulmonary effort is normal.   Cardiovascular:      PMI at left midclavicular line. Bradycardia present. Regular rhythm.      Murmurs: There is a systolic murmur.   Abdominal:      Palpations: Abdomen is soft.   Musculoskeletal: Normal range of motion.      Cervical back: Normal range of motion and neck supple. Skin:     General: Skin is warm and dry.   Neurological:      Mental Status: Oriented to person, place and time.           ECG 12 " Lead    Date/Time: 9/23/2021 1:01 PM  Performed by: Martina Witt APRN  Authorized by: Martina Witt APRN   Comparison: compared with previous ECG from 3/16/2021  Similar to previous ECG  Rhythm: sinus bradycardia  BPM: 55    Clinical impression: abnormal EKG  Comments: Normal QT, non-specific t wave changes            Assessment/Plan     PAF:  EKG done today in regards shows continued sinus candelario with normal QT. No asymptomatic bradycardia noted. We will stop amiodarone therapy as it has been over a year since her surgery. Eliquis continued at the same, bleeding and bruising denied.    HTN:  Blood pressure stable. Same metoprolol therapy will be continued. Limited sodium advised.     Anomalous RCA/Incision pain:  Recent stress and echo from 2021, showed no ischemia, good LV function, and no valvular concerns noted. ASA therapy will be continued at current. Chest xray advised to assess incision line, more recommendations to follow.     Hyperlipidemia:  On statin therapy with lipitor.  FLP followed by your office, no current available. Can we have for our records?     Chest discomfort/hernia:  Improved, but still present. She will have EGD with GI again soon.     BMI noted at 26.57, good cardiac diet with limited carbs, calories, and activity as tolerated advised.     Refills per request.     6 month follow up recommended or sooner if needed.          Problems Addressed this Visit        Cardiac and Vasculature    Essential hypertension    Relevant Medications    metoprolol tartrate (LOPRESSOR) 50 MG tablet    Hypercholesteremia    Relevant Medications    atorvastatin (LIPITOR) 20 MG tablet    PAF (paroxysmal atrial fibrillation) (CMS/HCC) - Primary    Relevant Medications    metoprolol tartrate (LOPRESSOR) 50 MG tablet    apixaban (ELIQUIS) 5 MG tablet tablet    Other Relevant Orders    ECG 12 Lead       Symptoms and Signs    Chest pressure      Other Visit Diagnoses     Shortness of breath        Relevant  Orders    XR Chest PA & Lateral    Bradycardia, drug induced        Incisional pain        Relevant Orders    XR Chest PA & Lateral    Overweight          Diagnoses       Codes Comments    PAF (paroxysmal atrial fibrillation) (HCC)    -  Primary ICD-10-CM: I48.0  ICD-9-CM: 427.31     Shortness of breath     ICD-10-CM: R06.02  ICD-9-CM: 786.05     Bradycardia, drug induced     ICD-10-CM: R00.1, T50.905A  ICD-9-CM: 427.89, E947.9     Essential hypertension     ICD-10-CM: I10  ICD-9-CM: 401.9     Hypercholesteremia     ICD-10-CM: E78.00  ICD-9-CM: 272.0     Chest pressure     ICD-10-CM: R07.89  ICD-9-CM: 786.59     Incisional pain     ICD-10-CM: L76.82  ICD-9-CM: 782.0     Overweight     ICD-10-CM: E66.3  ICD-9-CM: 278.02           Patient's Body mass index is 26.57 kg/m². indicating that she is overweight. Good cardiac diet with limited carbs, calories, and activity as tolerated advised. Walking regimen encouraged.           Electronically signed by SRIKANTH Plata September 24, 2021 11:44 EDT

## 2022-01-24 RX ORDER — LINACLOTIDE 72 UG/1
CAPSULE, GELATIN COATED ORAL
Qty: 30 CAPSULE | Refills: 11 | Status: SHIPPED | OUTPATIENT
Start: 2022-01-24

## 2022-03-24 ENCOUNTER — OFFICE VISIT (OUTPATIENT)
Dept: GASTROENTEROLOGY | Facility: CLINIC | Age: 67
End: 2022-03-24

## 2022-03-24 VITALS
SYSTOLIC BLOOD PRESSURE: 116 MMHG | DIASTOLIC BLOOD PRESSURE: 60 MMHG | HEIGHT: 69 IN | WEIGHT: 189 LBS | HEART RATE: 48 BPM | BODY MASS INDEX: 27.99 KG/M2

## 2022-03-24 DIAGNOSIS — K90.0 CELIAC DISEASE: Primary | ICD-10-CM

## 2022-03-24 PROCEDURE — 99213 OFFICE O/P EST LOW 20 MIN: CPT | Performed by: INTERNAL MEDICINE

## 2022-03-24 NOTE — PROGRESS NOTES
PCP:  Carissa Montejo APRN     No referring provider defined for this encounter.    Chief Complaint   Patient presents with   • Follow-up        HPI   The patient is here for her 1 year follow-up.  She has a history of celiac disease.  Interestingly her antibodies have been relatively normal but she had evidence of celiac disease on biopsies.  She follows a gluten-free diet.  Her last upper endoscopy was 4-21.  Biopsies at that time were normal suggesting adherence to the diet.  The biopsies on 12/17/2018 showed changes suggestive of celiac disease.  Her last colonoscopy was 4/9/2019 and was normal except internal hemorrhoids.  A 5-year follow-up was suggested.  She offers no new complaints.  She does think she has some adenopathy in her submandibular area although I cannot really palpate that myself.  She is going to see a ear nose and throat doctor tomorrow about this.    Allergies   Allergen Reactions   • Sulfa Antibiotics Swelling   • Penicillins Rash          Current Outpatient Medications:   •  apixaban (ELIQUIS) 5 MG tablet tablet, Take 1 tablet by mouth Every 12 (Twelve) Hours., Disp: 60 tablet, Rfl: 8  •  aspirin 81 MG EC tablet, Take 81 mg by mouth daily., Disp: , Rfl:   •  atorvastatin (LIPITOR) 20 MG tablet, Take 1 tablet by mouth Daily., Disp: 30 tablet, Rfl: 8  •  Cholecalciferol (VITAMIN D3) 2000 units tablet, Take 50 mcg by mouth Daily., Disp: , Rfl:   •  docusate sodium (COLACE) 100 MG capsule, Take 100 mg by mouth 2 (Two) Times a Day., Disp: , Rfl:   •  estradiol (ESTRACE) 0.5 MG tablet, Take 0.25 mg by mouth Daily., Disp: , Rfl:   •  levothyroxine (SYNTHROID, LEVOTHROID) 125 MCG tablet, Take 125 mcg by mouth Daily., Disp: , Rfl:   •  Linzess 72 MCG capsule capsule, TAKE 1 CAPSULE BY MOUTH EVERY MORNING BEFORE BREAKFAST, Disp: 30 capsule, Rfl: 11  •  metoprolol tartrate (LOPRESSOR) 50 MG tablet, Take 0.5 tablets by mouth 2 (Two) Times a Day. 1/2 tab twice a day, Disp: 60 tablet, Rfl:  8  •  nitroglycerin (NITROSTAT) 0.4 MG SL tablet, Place 0.4 mg under the tongue Every 5 (Five) Minutes As Needed for chest pain. Take no more than 3 doses in 15 minutes., Disp: , Rfl:   •  omeprazole (priLOSEC) 40 MG capsule, Take 40 mg by mouth 2 (Two) Times a Day., Disp: , Rfl:   •  sucralfate (CARAFATE) 1 G tablet, Take 1 g by mouth 4 (four) times a day., Disp: , Rfl:      Past Medical History:   Diagnosis Date   • Abnormal US (ultrasound) of abdomen 11/22/2010    US abdomen. No gallstones. 1.8cm benign cyst right kidney   • Abnormal US (ultrasound) of abdomen 11/22/2010    No gallstones. 1.8cm benign cyst right kidney.   • Anxiety and depression    • Celiac disease    • H/O: hysterectomy    • History of appendectomy    • History of basal cell cancer     removed   • Hyperlipidemia    • Hyperthyroidism    • Optional surgery     Adhesions removed as a child       Past Surgical History:   Procedure Laterality Date   • APPENDECTOMY     • CARDIAC CATHETERIZATION  10/25/2013    Anomalous origin of RCA, normal coronaries, EF65%   • CARDIAC CATHETERIZATION  02/18/2020    RCA arising from (L) Coronary Cusp.   • CARDIOVASCULAR STRESS TEST  03/14/2012    Stress- 9min, 85% THR./72, negative for ischemia   • CARDIOVASCULAR STRESS TEST  10/24/2013    L.Myoview- ([facility]) small apical infarct versus breast attenuation   • CARDIOVASCULAR STRESS TEST  11/15/2018    L. Cardiolite- Negative. EF 76%.   • CARDIOVASCULAR STRESS TEST  09/17/2019    L. Cardiolite- EF > 70%. Negative.   • CARDIOVASCULAR STRESS TEST  03/01/2021    EF 71%, no ischemia   • CHOLECYSTECTOMY     • CORONARY ARTERY BYPASS GRAFT  07/20/2020    Dr. Esteban- Reimplant of RCA   • ECHO - CONVERTED  09/18/2010    Echo- ([facility]. Dr. Barr) EF 65%   • ECHO - CONVERTED  09/18/2010    D. Echo- ([facility]. Dr. Barr) Negative.   • ECHO - CONVERTED  03/14/2012    Echo- EF 65-70%   • ECHO - CONVERTED  11/15/2018    EF 55-60%. Mild MR.   • ECHO - CONVERTED  09/17/2019    EF 65%.  Trace-Mild MR. RVSP- 22 mmHg.   • ECHO - CONVERTED  03/01/2021    EF 65-70%, normal diastolic function, trace MR   • HYSTERECTOMY     • OTHER SURGICAL HISTORY  02/20/2020    CTA Heart- Anamalous origina nd Course of RCA. Between Aorta and PA        Social History     Socioeconomic History   • Marital status:    Tobacco Use   • Smoking status: Never Smoker   • Smokeless tobacco: Never Used   Vaping Use   • Vaping Use: Never used   Substance and Sexual Activity   • Alcohol use: No   • Drug use: No   • Sexual activity: Defer        Family History   Problem Relation Age of Onset   • Heart disease Father    • Stroke Father    • Hypertension Father    • Heart disease Other    • Stroke Other    • Colon polyps Brother         Review of Systems     Vitals:    03/24/22 1350   BP: 116/60   Pulse: (!) 48        Physical Exam   General Appearance: Alert, in no acute distress   Head: Normocephalic, without obvious abnormality, atraumatic   Eyes: Lids and lashes normal, conjunctivae and sclerae normal, no icterus, no pallor, corneas clear, PERRLA   Ears: Ears appear intact with no abnormalities noted   Neck: No adenopathy, supple, trachea midline, no thyromegaly, no JVD   Lungs: respirations regular, even and unlabored Heart: Regular rhythm and normal rate   Chest Wall: Symmetrical respiratory expansion   Abdomen: No masses, no organomegaly, soft non-tender, non-distended, no guarding, no rebound tenderness   Extremities: Moves all extremities well, no edema, no cyanosis, no redness   Skin: No bleeding, bruising or rash   Neurologic: Cranial nerves 2 - 12 grossly intact, no focal deficits         Diagnoses and all orders for this visit:    1. Celiac disease (Primary)    Impressions and plan #1 celiac disease: She seems to be doing well on her diet.  I do not see a reason to reevaluate at the moment.  If she develops any symptoms we will evaluate further.  I do not feel any adenopathy in her submandibular area but she is  seen ENT which I think is gonzalez.  Her mother had lymphoma so she is worried.    #2 family history of colonic neoplasia: She is not due for colonoscopy until 2024 although if she develops symptoms we will plan that earlier.    Ernesto Mirza MD

## 2022-03-31 ENCOUNTER — OFFICE VISIT (OUTPATIENT)
Dept: CARDIOLOGY | Facility: CLINIC | Age: 67
End: 2022-03-31

## 2022-03-31 VITALS
SYSTOLIC BLOOD PRESSURE: 128 MMHG | DIASTOLIC BLOOD PRESSURE: 76 MMHG | WEIGHT: 192 LBS | HEIGHT: 69 IN | HEART RATE: 62 BPM | BODY MASS INDEX: 28.44 KG/M2

## 2022-03-31 DIAGNOSIS — F41.9 ANXIETY: ICD-10-CM

## 2022-03-31 DIAGNOSIS — E78.00 HYPERCHOLESTEREMIA: ICD-10-CM

## 2022-03-31 DIAGNOSIS — R59.9 ENLARGED LYMPH NODE: ICD-10-CM

## 2022-03-31 DIAGNOSIS — I10 ESSENTIAL HYPERTENSION: ICD-10-CM

## 2022-03-31 DIAGNOSIS — I48.0 PAF (PAROXYSMAL ATRIAL FIBRILLATION): ICD-10-CM

## 2022-03-31 DIAGNOSIS — R00.2 PALPITATIONS: Primary | ICD-10-CM

## 2022-03-31 DIAGNOSIS — E66.3 OVERWEIGHT: ICD-10-CM

## 2022-03-31 PROCEDURE — 99214 OFFICE O/P EST MOD 30 MIN: CPT | Performed by: NURSE PRACTITIONER

## 2022-03-31 PROCEDURE — 93000 ELECTROCARDIOGRAM COMPLETE: CPT | Performed by: NURSE PRACTITIONER

## 2022-03-31 RX ORDER — METOPROLOL TARTRATE 50 MG/1
25 TABLET, FILM COATED ORAL 2 TIMES DAILY
Qty: 60 TABLET | Refills: 8 | Status: SHIPPED | OUTPATIENT
Start: 2022-03-31 | End: 2022-10-18 | Stop reason: SDUPTHER

## 2022-03-31 RX ORDER — ATORVASTATIN CALCIUM 20 MG/1
20 TABLET, FILM COATED ORAL DAILY
Qty: 30 TABLET | Refills: 8 | Status: SHIPPED | OUTPATIENT
Start: 2022-03-31 | End: 2022-10-18 | Stop reason: SDUPTHER

## 2022-08-08 ENCOUNTER — TELEPHONE (OUTPATIENT)
Dept: CARDIOLOGY | Facility: CLINIC | Age: 67
End: 2022-08-08

## 2022-08-08 NOTE — TELEPHONE ENCOUNTER
Received fax from Dr. Jarod Yeh for cardiac clearance for patient to have a LESI. Patient is on Eliquis and they are requesting to hold for 3 days. According to our records, I do not see where patient has had any stenting. Patient had a CABG on 07/20/2020. Patient has a history of PAF.         Fax [phone]

## 2022-10-18 ENCOUNTER — OFFICE VISIT (OUTPATIENT)
Dept: CARDIOLOGY | Facility: CLINIC | Age: 67
End: 2022-10-18

## 2022-10-18 VITALS
HEIGHT: 69 IN | WEIGHT: 193 LBS | BODY MASS INDEX: 28.58 KG/M2 | HEART RATE: 58 BPM | DIASTOLIC BLOOD PRESSURE: 68 MMHG | SYSTOLIC BLOOD PRESSURE: 116 MMHG

## 2022-10-18 DIAGNOSIS — I10 ESSENTIAL HYPERTENSION: ICD-10-CM

## 2022-10-18 DIAGNOSIS — I48.0 PAF (PAROXYSMAL ATRIAL FIBRILLATION): ICD-10-CM

## 2022-10-18 DIAGNOSIS — E78.00 HYPERCHOLESTEREMIA: ICD-10-CM

## 2022-10-18 DIAGNOSIS — Q24.5 ANOMALOUS ORIGIN OF RIGHT CORONARY ARTERY: Primary | ICD-10-CM

## 2022-10-18 PROCEDURE — 99214 OFFICE O/P EST MOD 30 MIN: CPT | Performed by: NURSE PRACTITIONER

## 2022-10-18 PROCEDURE — 93000 ELECTROCARDIOGRAM COMPLETE: CPT | Performed by: NURSE PRACTITIONER

## 2022-10-18 RX ORDER — METOPROLOL TARTRATE 50 MG/1
25 TABLET, FILM COATED ORAL 2 TIMES DAILY
Qty: 180 TABLET | Refills: 2 | Status: SHIPPED | OUTPATIENT
Start: 2022-10-18

## 2022-10-18 RX ORDER — CARBOXYMETHYLCELLULOSE SODIUM AND GLYCERIN 5; 9 MG/ML; MG/ML
1 SOLUTION/ DROPS OPHTHALMIC 4 TIMES DAILY
COMMUNITY

## 2022-10-18 RX ORDER — ATORVASTATIN CALCIUM 20 MG/1
20 TABLET, FILM COATED ORAL DAILY
Qty: 90 TABLET | Refills: 2 | Status: SHIPPED | OUTPATIENT
Start: 2022-10-18

## 2022-10-18 NOTE — PROGRESS NOTES
Chief Complaint   Patient presents with   • Follow-up     Cardiac management. She has been suffering from Sciatic nerve and bulged disc, she is awaiting to see someone from pain clinic   • Atrial Fibrillation     Reports she has not had any significant episodes of AFIB   • Med Refill     Needs refills on Metoprolol, Lipitor and Eliquis 90 day supply to [facility]   • LABS     Has routine labs per PCP, patient reports all were normal       Subjective       Yulia Abebe is a 67 y.o. 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  She has been tried manage medically with increasing beta-blockers and nitrates but the symptoms persisted.  She was referred to the CT surgery service at .  She underwent surgery with reimplanting the RCA to anterior part of the aorta.  Her postoperative course was complicated with PAF for which she underwent JESSY and cardioversion.  She was discharged home on amiodarone and Eliquis.  Patient went to the hospital at [facility] March 2021 with chest pain and weakness. She underwent EKG at that time which showed no acute ST changes, troponin was negative. Stress and echo were recommended. Stress showed no ischemia with good LV function, no effusion noted.  Patient was advised chest pain appeared to be more GI in nature and was advised to follow with GI for EGD. Most recent EGD showed hernia in April 2021, she is followed by Dr. Mirza. She did have more chest and repeat stress showed EF of 70%, and no ischemia. Echo showed EF 65-70%, no diastolic dysfunction. In January 2022 she went to ER for pulled muscle and reported cardiac workup was negative.     At last visit she reported following with ENT regarding nodules in her neck. US of left neck showed enlarged     Today she returns to the office for a follow up visit.  She admits to occasionally feeling her chest wall tighten and creating a mild  muscular type pain.  Anginal symptoms denied.  She has not had palpitations, increase shortness of breath, lightheadedness or dizziness, or edema.  This summer she has been doing yard work without issue.  She plans to go deer hunting.  Her main stress has been recently finding out her younger sister has lung cancer and not tolerating her chemotherapy treatments very well.    Cardiac History:    Past Surgical History:   Procedure Laterality Date   • APPENDECTOMY     • CARDIAC CATHETERIZATION  10/25/2013    Anomalous origin of RCA, normal coronaries, EF65%   • CARDIAC CATHETERIZATION  02/18/2020    RCA arising from (L) Coronary Cusp.   • CARDIOVASCULAR STRESS TEST  03/14/2012    Stress- 9min, 85% THR./72, negative for ischemia   • CARDIOVASCULAR STRESS TEST  10/24/2013    L.Myoview- ([facility]) small apical infarct versus breast attenuation   • CARDIOVASCULAR STRESS TEST  11/15/2018    L. Cardiolite- Negative. EF 76%.   • CARDIOVASCULAR STRESS TEST  09/17/2019    L. Cardiolite- EF > 70%. Negative.   • CARDIOVASCULAR STRESS TEST  03/01/2021    EF 71%, no ischemia   • CHOLECYSTECTOMY     • CORONARY ARTERY BYPASS GRAFT  07/20/2020    Dr. Esteban- Reimplant of RCA   • ECHO - CONVERTED  09/18/2010    Echo- ([facility]. Dr. Barr) EF 65%   • ECHO - CONVERTED  09/18/2010    D. Echo- ([facility]. Dr. Barr) Negative.   • ECHO - CONVERTED  03/14/2012    Echo- EF 65-70%   • ECHO - CONVERTED  11/15/2018    EF 55-60%. Mild MR.   • ECHO - CONVERTED  09/17/2019    EF 65%. Trace-Mild MR. RVSP- 22 mmHg.   • ECHO - CONVERTED  03/01/2021    EF 65-70%, normal diastolic function, trace MR   • HYSTERECTOMY     • OTHER SURGICAL HISTORY  02/20/2020    CTA Heart- Anamalous origina nd Course of RCA. Between Aorta and PA       Current Outpatient Medications   Medication Sig Dispense Refill   • apixaban (ELIQUIS) 5 MG tablet tablet Take 1 tablet by mouth Every 12 (Twelve) Hours. 180 tablet 2   • aspirin 81 MG EC tablet Take 81 mg by mouth daily.     •  atorvastatin (LIPITOR) 20 MG tablet Take 1 tablet by mouth Daily. 90 tablet 2   • Carboxymethylcellul-Glycerin (Refresh Optive) 0.5-0.9 % solution Apply 1 drop to eye(s) as directed by provider 4 (Four) Times a Day.     • Carboxymethylcellulose Sodium (REFRESH LIQUIGEL OP) Apply 1 drop to eye(s) as directed by provider Every Night.     • Cholecalciferol (VITAMIN D3) 2000 units tablet Take 50 mcg by mouth Daily.     • estradiol (ESTRACE) 0.5 MG tablet Take 0.25 mg by mouth Daily.     • levothyroxine (SYNTHROID, LEVOTHROID) 125 MCG tablet Take 125 mcg by mouth Daily.     • Linzess 72 MCG capsule capsule TAKE 1 CAPSULE BY MOUTH EVERY MORNING BEFORE BREAKFAST (Patient taking differently: Take 1 capsule by mouth Daily As Needed.) 30 capsule 11   • metoprolol tartrate (LOPRESSOR) 50 MG tablet Take 0.5 tablets by mouth 2 (Two) Times a Day. 1/2 tab twice a day 180 tablet 2   • omeprazole (priLOSEC) 40 MG capsule Take 40 mg by mouth 2 (Two) Times a Day.     • sucralfate (CARAFATE) 1 G tablet Take 1 g by mouth 4 (four) times a day.       No current facility-administered medications for this visit.       Sulfa antibiotics and Penicillins    Past Medical History:   Diagnosis Date   • Abnormal US (ultrasound) of abdomen 11/22/2010    US abdomen. No gallstones. 1.8cm benign cyst right kidney   • Abnormal US (ultrasound) of abdomen 11/22/2010    No gallstones. 1.8cm benign cyst right kidney.   • Anxiety and depression    • Celiac disease    • H/O: hysterectomy    • History of appendectomy    • History of basal cell cancer     removed   • Hyperlipidemia    • Hyperthyroidism    • Lump in neck 03/2021   • Optional surgery     Adhesions removed as a child       Social History     Socioeconomic History   • Marital status:    Tobacco Use   • Smoking status: Never   • Smokeless tobacco: Never   Vaping Use   • Vaping Use: Never used   Substance and Sexual Activity   • Alcohol use: No   • Drug use: No   • Sexual activity: Defer  "      Family History   Problem Relation Age of Onset   • Heart disease Father    • Stroke Father    • Hypertension Father    • Heart disease Other    • Stroke Other    • Colon polyps Brother        Review of Systems   Constitutional: Negative for decreased appetite, diaphoresis, fever and malaise/fatigue.   HENT: Negative for nosebleeds.    Eyes: Negative for blurred vision.   Cardiovascular: Negative for chest pain, claudication, cyanosis, dyspnea on exertion, irregular heartbeat, leg swelling, near-syncope, orthopnea, palpitations, paroxysmal nocturnal dyspnea and syncope.   Respiratory: Negative for shortness of breath.    Endocrine: Negative for cold intolerance and heat intolerance.   Hematologic/Lymphatic: Does not bruise/bleed easily.   Skin: Negative for rash.   Musculoskeletal: Positive for back pain (No worse). Negative for myalgias.   Gastrointestinal: Positive for constipation (Takes Linzess as needed). Negative for heartburn, melena and nausea.   Genitourinary: Negative for dysuria and hematuria.   Neurological: Negative for dizziness and light-headedness.   Psychiatric/Behavioral: The patient is nervous/anxious. The patient does not have insomnia.         Sister diagnosed with lung cancer        BP Readings from Last 5 Encounters:   10/18/22 116/68   03/31/22 128/76   03/24/22 116/60   09/23/21 122/84   03/16/21 128/80       Wt Readings from Last 5 Encounters:   10/18/22 87.5 kg (193 lb)   03/31/22 87.1 kg (192 lb)   03/24/22 85.7 kg (189 lb)   09/23/21 81.6 kg (180 lb)   03/16/21 83.1 kg (183 lb 3.2 oz)       Objective     /68 (BP Location: Left arm, Patient Position: Sitting)   Pulse 58   Ht 175.3 cm (69\")   Wt 87.5 kg (193 lb)   BMI 28.50 kg/m²     Vitals and nursing note reviewed.   Eyes:      Pupils: Pupils are equal, round, and reactive to light.   HENT:      Head: Normocephalic.   Neck:      Vascular: No carotid bruit or JVD.   Pulmonary:      Breath sounds: Normal breath sounds. "   Cardiovascular:      Normal rate. Regular rhythm.   Pulses:     Intact distal pulses.   Edema:     Peripheral edema absent.   Abdominal:      General: Bowel sounds are normal.      Palpations: Abdomen is soft.   Musculoskeletal: Normal range of motion.      Cervical back: Normal range of motion. Skin:     General: Skin is warm.   Neurological:      Mental Status: Alert and oriented to person, place, and time.            ECG 12 Lead    Date/Time: 10/18/2022 9:14 AM  Performed by: Nancy Slade APRN  Authorized by: Nancy Slade APRN   Comparison: compared with previous ECG from 3/31/2022  Similar to previous ECG  Rhythm: sinus bradycardia  BPM: 59                   Assessment & Plan   Diagnoses and all orders for this visit:    1. 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 PAF (paroxysmal atrial fibrillation) (HCC)  -     apixaban (ELIQUIS) 5 MG tablet tablet; Take 1 tablet by mouth Every 12 (Twelve) Hours.  Dispense: 180 tablet; Refill: 2  -     metoprolol tartrate (LOPRESSOR) 50 MG tablet; Take 0.5 tablets by mouth 2 (Two) Times a Day. 1/2 tab twice a day  Dispense: 180 tablet; Refill: 2    3. Hypercholesteremia  -     atorvastatin (LIPITOR) 20 MG tablet; Take 1 tablet by mouth Daily.  Dispense: 90 tablet; Refill: 2    4. Essential hypertension  -     metoprolol tartrate (LOPRESSOR) 50 MG tablet; Take 0.5 tablets by mouth 2 (Two) Times a Day. 1/2 tab twice a day  Dispense: 180 tablet; Refill: 2    Other orders  -     ECG 12 Lead    PAF  -Palpitations denied.  EKG shows sinus bradycardia at a rate of 59 bpm.  Normal MN and QT intervals noted.  Continue beta-blocker and refills faxed to her pharmacy.  -For stroke prevention she is on Eliquis therapy and tolerating well.  Refills faxed to her pharmacy.    Hypercholesterolemia  -Please forward copy of next lab report.  -Refills given to continue Lipitor 20 mg daily.  - Heart healthy diet also encouraged.    Hypertension  - BP normal today.   Continue current antihypertensive management    History of RCA anomaly undergoing surgical correction.  She denies cardiac symptoms or concerns.  No repeat cardiac testing ordered at this time.    A 6-month follow-up visit scheduled.  Please call sooner for cardiac concerns.

## 2023-03-07 NOTE — TELEPHONE ENCOUNTER
FYI  Patient is scheduled Thursday 7/16/20 to see Dr. Esteban.  Patient spoke with Dr. Esteban's nurse, Falguni and was given appointment.    I faxed last 2 ER notes and Dr. Bourgeois's last OV note to Dr. Esteban's office.   <-- Click to add NO significant Past Surgical History

## 2023-03-30 ENCOUNTER — OFFICE VISIT (OUTPATIENT)
Dept: GASTROENTEROLOGY | Facility: CLINIC | Age: 68
End: 2023-03-30
Payer: MEDICARE

## 2023-03-30 VITALS — WEIGHT: 190 LBS | BODY MASS INDEX: 28.14 KG/M2 | HEIGHT: 69 IN

## 2023-03-30 DIAGNOSIS — Z80.0 FAMILY HISTORY OF GI MALIGNANCY: ICD-10-CM

## 2023-03-30 DIAGNOSIS — K90.0 CELIAC DISEASE: Primary | ICD-10-CM

## 2023-03-30 PROCEDURE — 99213 OFFICE O/P EST LOW 20 MIN: CPT | Performed by: INTERNAL MEDICINE

## 2023-03-30 RX ORDER — ACETAMINOPHEN 160 MG
1 TABLET,DISINTEGRATING ORAL DAILY
COMMUNITY
Start: 2022-12-27

## 2023-03-30 RX ORDER — HYDROCHLOROTHIAZIDE 12.5 MG/1
12.5 TABLET ORAL EVERY MORNING
COMMUNITY
Start: 2022-12-27

## 2023-03-30 NOTE — PROGRESS NOTES
PCP:  Carissa Montejo, SRIKANTH     No referring provider defined for this encounter.    Chief Complaint   Patient presents with   • Celiac Disease     1 yr follow up        HPI   The patient is a 67-year-old here for 1 year follow-up of her Crohn's disease.  She has been relatively gluten-free.  Her maternal uncle may have had colon cancer and possibly brother as well.  She has another brother who had polyps at least.  She has no new complaints.  Last upper endoscopy was 4/2/2021.  This showed gastritis and biopsies were negative for eosinophilic esophagitis as well as celiac disease.  Her last colonoscopy was 4/9/2019.  She had fixation of the sigmoid and internal hemorrhoids but no polyps were seen.  A 5-year follow-up will be suggested which would be in April 2024.  She will call us with troubles.  She had some recent blood work.  I reviewed her 12/14/2022 blood work.  Her liver chemistries were normal.  Her hemoglobin hematocrit and platelet count were normal.  Her creatinine was normal.    Allergies   Allergen Reactions   • Sulfa Antibiotics Swelling   • Penicillins Rash          Current Outpatient Medications:   •  apixaban (ELIQUIS) 5 MG tablet tablet, Take 1 tablet by mouth Every 12 (Twelve) Hours., Disp: 180 tablet, Rfl: 2  •  aspirin 81 MG EC tablet, Take 81 mg by mouth daily., Disp: , Rfl:   •  atorvastatin (LIPITOR) 20 MG tablet, Take 1 tablet by mouth Daily., Disp: 90 tablet, Rfl: 2  •  Carboxymethylcellul-Glycerin (Refresh Optive) 0.5-0.9 % solution, Apply 1 drop to eye(s) as directed by provider 4 (Four) Times a Day., Disp: , Rfl:   •  Carboxymethylcellulose Sodium (REFRESH LIQUIGEL OP), Apply 1 drop to eye(s) as directed by provider Every Night., Disp: , Rfl:   •  Cholecalciferol (VITAMIN D3) 2000 units tablet, Take 50 mcg by mouth Daily., Disp: , Rfl:   •  Cholecalciferol (Vitamin D3) 50 MCG (2000 UT) capsule, Take 1 capsule by mouth Daily., Disp: , Rfl:   •  estradiol (ESTRACE) 0.5 MG  tablet, Take 0.25 mg by mouth Daily., Disp: , Rfl:   •  hydroCHLOROthiazide (HYDRODIURIL) 12.5 MG tablet, Take 1 tablet by mouth Every Morning., Disp: , Rfl:   •  levothyroxine (SYNTHROID, LEVOTHROID) 125 MCG tablet, Take 125 mcg by mouth Daily., Disp: , Rfl:   •  Linzess 72 MCG capsule capsule, TAKE 1 CAPSULE BY MOUTH EVERY MORNING BEFORE BREAKFAST (Patient taking differently: Take 1 capsule by mouth Daily As Needed.), Disp: 30 capsule, Rfl: 11  •  metoprolol tartrate (LOPRESSOR) 50 MG tablet, Take 0.5 tablets by mouth 2 (Two) Times a Day. 1/2 tab twice a day, Disp: 180 tablet, Rfl: 2  •  omeprazole (priLOSEC) 40 MG capsule, Take 40 mg by mouth 2 (Two) Times a Day., Disp: , Rfl:   •  sucralfate (CARAFATE) 1 G tablet, Take 1 g by mouth 4 (four) times a day., Disp: , Rfl:      Past Medical History:   Diagnosis Date   • Abnormal US (ultrasound) of abdomen 11/22/2010    US abdomen. No gallstones. 1.8cm benign cyst right kidney   • Abnormal US (ultrasound) of abdomen 11/22/2010    No gallstones. 1.8cm benign cyst right kidney.   • Anxiety and depression    • Celiac disease    • H/O: hysterectomy    • History of appendectomy    • History of basal cell cancer     removed   • Hyperlipidemia    • Hyperthyroidism    • Lump in neck 03/2021   • Optional surgery     Adhesions removed as a child       Past Surgical History:   Procedure Laterality Date   • APPENDECTOMY     • CARDIAC CATHETERIZATION  10/25/2013    Anomalous origin of RCA, normal coronaries, EF65%   • CARDIAC CATHETERIZATION  02/18/2020    RCA arising from (L) Coronary Cusp.   • CARDIOVASCULAR STRESS TEST  03/14/2012    Stress- 9min, 85% THR./72, negative for ischemia   • CARDIOVASCULAR STRESS TEST  10/24/2013    L.Myoview- ([facility]) small apical infarct versus breast attenuation   • CARDIOVASCULAR STRESS TEST  11/15/2018    L. Cardiolite- Negative. EF 76%.   • CARDIOVASCULAR STRESS TEST  09/17/2019    L. Cardiolite- EF > 70%. Negative.   • CARDIOVASCULAR STRESS  TEST  03/01/2021    EF 71%, no ischemia   • CHOLECYSTECTOMY     • CORONARY ARTERY BYPASS GRAFT  07/20/2020    Dr. Esteban- Reimplant of RCA   • ECHO - CONVERTED  09/18/2010    Echo- ([facility]. Dr. Barr) EF 65%   • ECHO - CONVERTED  09/18/2010    D. Echo- ([facility]. Dr. Barr) Negative.   • ECHO - CONVERTED  03/14/2012    Echo- EF 65-70%   • ECHO - CONVERTED  11/15/2018    EF 55-60%. Mild MR.   • ECHO - CONVERTED  09/17/2019    EF 65%. Trace-Mild MR. RVSP- 22 mmHg.   • ECHO - CONVERTED  03/01/2021    EF 65-70%, normal diastolic function, trace MR   • HYSTERECTOMY     • OTHER SURGICAL HISTORY  02/20/2020    CTA Heart- Anamalous origina nd Course of RCA. Between Aorta and PA        Social History     Socioeconomic History   • Marital status:    Tobacco Use   • Smoking status: Never   • Smokeless tobacco: Never   Vaping Use   • Vaping Use: Never used   Substance and Sexual Activity   • Alcohol use: No   • Drug use: No   • Sexual activity: Defer        Family History   Problem Relation Age of Onset   • Heart disease Father    • Stroke Father    • Hypertension Father    • Heart disease Other    • Stroke Other    • Colon polyps Brother         Review of Systems     There were no vitals filed for this visit.     Physical Exam     Diagnoses and all orders for this visit:    1. Celiac disease (Primary)    2. Family history of GI malignancy    Impressions and plan #1 celiac disease: She is trying to adhere to a gluten-free diet.  Her last biopsies were normal which suggests good control.  We will see her back in a year.  She has had recent blood work which is fairly unremarkable.    #2 family history of colon cancer: She is due for repeat colonoscopy next year.  She will call us before that if she has issues.    Ernesto Mirza MD

## 2023-05-09 ENCOUNTER — OFFICE VISIT (OUTPATIENT)
Dept: CARDIOLOGY | Facility: CLINIC | Age: 68
End: 2023-05-09
Payer: MEDICARE

## 2023-05-09 VITALS
DIASTOLIC BLOOD PRESSURE: 80 MMHG | HEART RATE: 60 BPM | SYSTOLIC BLOOD PRESSURE: 140 MMHG | WEIGHT: 185.8 LBS | HEIGHT: 69 IN | BODY MASS INDEX: 27.52 KG/M2

## 2023-05-09 DIAGNOSIS — I10 ESSENTIAL HYPERTENSION: ICD-10-CM

## 2023-05-09 DIAGNOSIS — Q24.5 ANOMALOUS ORIGIN OF RIGHT CORONARY ARTERY: Primary | ICD-10-CM

## 2023-05-09 DIAGNOSIS — E88.81 METABOLIC SYNDROME: ICD-10-CM

## 2023-05-09 DIAGNOSIS — I48.0 PAF (PAROXYSMAL ATRIAL FIBRILLATION): ICD-10-CM

## 2023-05-09 DIAGNOSIS — E78.00 HYPERCHOLESTEREMIA: ICD-10-CM

## 2023-05-09 PROCEDURE — 1160F RVW MEDS BY RX/DR IN RCRD: CPT | Performed by: INTERNAL MEDICINE

## 2023-05-09 PROCEDURE — 3077F SYST BP >= 140 MM HG: CPT | Performed by: INTERNAL MEDICINE

## 2023-05-09 PROCEDURE — 3079F DIAST BP 80-89 MM HG: CPT | Performed by: INTERNAL MEDICINE

## 2023-05-09 PROCEDURE — 99214 OFFICE O/P EST MOD 30 MIN: CPT | Performed by: INTERNAL MEDICINE

## 2023-05-09 PROCEDURE — 1159F MED LIST DOCD IN RCRD: CPT | Performed by: INTERNAL MEDICINE

## 2023-05-09 RX ORDER — ATORVASTATIN CALCIUM 20 MG/1
20 TABLET, FILM COATED ORAL DAILY
Qty: 90 TABLET | Refills: 2 | Status: SHIPPED | OUTPATIENT
Start: 2023-05-09

## 2023-05-09 RX ORDER — METOPROLOL TARTRATE 50 MG/1
25 TABLET, FILM COATED ORAL 2 TIMES DAILY
Qty: 180 TABLET | Refills: 2 | Status: SHIPPED | OUTPATIENT
Start: 2023-05-09

## 2023-05-09 RX ORDER — LOSARTAN POTASSIUM 25 MG/1
25 TABLET ORAL DAILY
Qty: 90 TABLET | Refills: 3 | Status: SHIPPED | OUTPATIENT
Start: 2023-05-09

## 2023-05-09 RX ORDER — ASPIRIN 81 MG/1
81 TABLET ORAL DAILY
Qty: 90 TABLET | Refills: 3 | Status: SHIPPED | OUTPATIENT
Start: 2023-05-09

## 2023-05-09 NOTE — LETTER
May 9, 2023       No Recipients    Patient: Yulia Abebe   YOB: 1955   Date of Visit: 5/9/2023       Dear Dr. Wisdom Recipients:    Thank you for referring Yulia Abebe to me for evaluation. Below are the relevant portions of my assessment and plan of care.    If you have questions, please do not hesitate to call me. I look forward to following Yulia along with you.         Sincerely,        Kris Bourgeois MD        CC:   No Recipients    Kris Bourgeois MD  05/09/23 1137  Sign when Signing Visit  Chief Complaint   Patient presents with   • Follow-up     Pt is here for cardiac follow up.  Pt denies CP, SOB, dizziness or palpitations.  Pt does take a daily ASA.   • Med Refill     Pt request 90 day refills to be sent to [facility].  Medications were verified by med list.     • Lab Work     Pt states their last labs were in March  with her PCP.       CARDIAC COMPLAINTS  dyspnea       Subjective   Yulia Abebe is a 67 y.o. female came in today for her follow-up visit.  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  After trying with medical management, she did undergo bypass surgery with reimplanting the right coronary artery at .  She did have PAF for which she has been on a blood thinner for almost 3 years now.  She came today with no new symptoms.  She denies having any chest pain or shortness of breath.  She apparently had recent labs but I do not have the report.  The last set of labs I have is from December and that time the cholesterol was normal.  A1c was slightly up at 6.4.  She has not had any more palpitation no more dizziness.  She developed some back pain when she was outside mowing the yard.  She was told it was a bulged disc but things got better.              Cardiac History  Past Surgical History:    Procedure Laterality Date   • APPENDECTOMY     • CARDIAC CATHETERIZATION  10/25/2013    Anomalous origin of RCA, normal coronaries, EF65%   • CARDIAC CATHETERIZATION  02/18/2020    RCA arising from (L) Coronary Cusp.   • CARDIOVASCULAR STRESS TEST  03/14/2012    Stress- 9min, 85% THR./72, negative for ischemia   • CARDIOVASCULAR STRESS TEST  10/24/2013    L.Myoview- ([facility]) small apical infarct versus breast attenuation   • CARDIOVASCULAR STRESS TEST  11/15/2018    L. Cardiolite- Negative. EF 76%.   • CARDIOVASCULAR STRESS TEST  09/17/2019    L. Cardiolite- EF > 70%. Negative.   • CARDIOVASCULAR STRESS TEST  03/01/2021    EF 71%, no ischemia   • CHOLECYSTECTOMY     • CORONARY ARTERY BYPASS GRAFT  07/20/2020    Dr. Esteban- Reimplant of RCA   • ECHO - CONVERTED  09/18/2010    Echo- ([facility]. Dr. Barr) EF 65%   • ECHO - CONVERTED  09/18/2010    D. Echo- ([facility]. Dr. Barr) Negative.   • ECHO - CONVERTED  03/14/2012    Echo- EF 65-70%   • ECHO - CONVERTED  11/15/2018    EF 55-60%. Mild MR.   • ECHO - CONVERTED  09/17/2019    EF 65%. Trace-Mild MR. RVSP- 22 mmHg.   • ECHO - CONVERTED  03/01/2021    EF 65-70%, normal diastolic function, trace MR   • HYSTERECTOMY     • OTHER SURGICAL HISTORY  02/20/2020    CTA Heart- Anamalous origina nd Course of RCA. Between Aorta and PA       Current Outpatient Medications   Medication Sig Dispense Refill   • aspirin 81 MG EC tablet Take 1 tablet by mouth Daily. 90 tablet 3   • atorvastatin (LIPITOR) 20 MG tablet Take 1 tablet by mouth Daily. 90 tablet 2   • Carboxymethylcellul-Glycerin (Refresh Optive) 0.5-0.9 % solution Apply 1 drop to eye(s) as directed by provider 4 (Four) Times a Day.     • Carboxymethylcellulose Sodium (REFRESH LIQUIGEL OP) Apply 1 drop to eye(s) as directed by provider Every Night.     • Cholecalciferol (VITAMIN D3) 2000 units tablet Take 1 tablet by mouth Daily.     • estradiol (ESTRACE) 0.5 MG tablet Take 0.25 mg by mouth Daily.     • levothyroxine  (SYNTHROID, LEVOTHROID) 125 MCG tablet Take 1 tablet by mouth Daily.     • metoprolol tartrate (LOPRESSOR) 50 MG tablet Take 0.5 tablets by mouth 2 (Two) Times a Day. 1/2 tab twice a day 180 tablet 2   • omeprazole (priLOSEC) 40 MG capsule Take 1 capsule by mouth 2 (Two) Times a Day.     • sucralfate (CARAFATE) 1 G tablet Take 1 tablet by mouth 4 (Four) Times a Day.     • losartan (Cozaar) 25 MG tablet Take 1 tablet by mouth Daily. 90 tablet 3     No current facility-administered medications for this visit.       Allergies  :  Sulfa antibiotics and Penicillins       Past Medical History:   Diagnosis Date   • Abnormal US (ultrasound) of abdomen 11/22/2010    US abdomen. No gallstones. 1.8cm benign cyst right kidney   • Abnormal US (ultrasound) of abdomen 11/22/2010    No gallstones. 1.8cm benign cyst right kidney.   • Anxiety and depression    • Bulging lumbar disc    • Celiac disease    • Dry eye    • H/O: hysterectomy    • History of appendectomy    • History of basal cell cancer     removed   • Hyperlipidemia    • Hyperthyroidism    • Lump in neck 03/2021   • Optional surgery     Adhesions removed as a child       Social History     Socioeconomic History   • Marital status:    Tobacco Use   • Smoking status: Never   • Smokeless tobacco: Never   Vaping Use   • Vaping Use: Never used   Substance and Sexual Activity   • Alcohol use: No   • Drug use: No   • Sexual activity: Defer       Family History   Problem Relation Age of Onset   • Heart disease Father    • Stroke Father    • Hypertension Father    • Heart disease Other    • Stroke Other    • Colon polyps Brother        Review of Systems   Constitutional: Negative for decreased appetite and malaise/fatigue.   HENT: Negative for congestion and sore throat.    Eyes: Negative for blurred vision, double vision and visual disturbance.   Cardiovascular: Negative for chest pain and palpitations.   Respiratory: Negative for shortness of breath and snoring.   "  Endocrine: Negative for cold intolerance and heat intolerance.   Hematologic/Lymphatic: Negative for adenopathy. Does not bruise/bleed easily.   Skin: Negative for itching, nail changes and skin cancer.   Musculoskeletal: Negative for arthritis and myalgias.   Gastrointestinal: Negative for abdominal pain, dysphagia and heartburn.   Genitourinary: Negative for bladder incontinence and frequency.   Neurological: Negative for dizziness, seizures and vertigo.   Psychiatric/Behavioral: Negative for altered mental status.   Allergic/Immunologic: Negative for environmental allergies and hives.       Diabetes- No  Thyroid- abnormal    Objective     /80 (BP Location: Left arm, Patient Position: Sitting)   Pulse 60   Ht 175.3 cm (69\")   Wt 84.3 kg (185 lb 12.8 oz)   BMI 27.44 kg/m²     Vitals and nursing note reviewed.   Constitutional:       Appearance: Healthy appearance. Not in distress.   Eyes:      Conjunctiva/sclera: Conjunctivae normal.      Pupils: Pupils are equal, round, and reactive to light.   HENT:      Head: Normocephalic.   Pulmonary:      Effort: Pulmonary effort is normal.      Breath sounds: Normal breath sounds.   Cardiovascular:      PMI at left midclavicular line. Normal rate. Regular rhythm.   Abdominal:      General: Bowel sounds are normal.      Palpations: Abdomen is soft.   Musculoskeletal: Normal range of motion.      Cervical back: Normal range of motion and neck supple. Skin:     General: Skin is warm and dry.   Neurological:      Mental Status: Alert, oriented to person, place, and time and oriented to person, place and time.       Procedures           @ASSESSMENT/PLAN@  BMI is >= 25 and <30. (Overweight) The following options were offered after discussion;: weight loss educational material (shared in after visit summary), exercise counseling/recommendations and nutrition counseling/recommendations     Diagnoses and all orders for this visit:    1. 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  Dispense: 90 tablet; Refill: 3    2. Essential hypertension  -     metoprolol tartrate (LOPRESSOR) 50 MG tablet; Take 0.5 tablets by mouth 2 (Two) Times a Day. 1/2 tab twice a day  Dispense: 180 tablet; Refill: 2  -     losartan (Cozaar) 25 MG tablet; Take 1 tablet by mouth Daily.  Dispense: 90 tablet; Refill: 3    3. Hypercholesteremia  -     atorvastatin (LIPITOR) 20 MG tablet; Take 1 tablet by mouth Daily.  Dispense: 90 tablet; Refill: 2    4. Metabolic syndrome    5. PAF (paroxysmal atrial fibrillation)  -     metoprolol tartrate (LOPRESSOR) 50 MG tablet; Take 0.5 tablets by mouth 2 (Two) Times a Day. 1/2 tab twice a day  Dispense: 180 tablet; Refill: 2  -     Discontinue: apixaban (ELIQUIS) 5 MG tablet tablet; Take 1 tablet by mouth Every 12 (Twelve) Hours.  Dispense: 180 tablet; Refill: 2       At baseline her heart rate is stable.  Her blood pressure is borderline elevated.  Her BMI is still up at 27.  Her cardiovascular examination is otherwise unremarkable.  Regarding the anomalous origin of the right coronary artery, she has undergone    Reimplant of the origin.  At this time she is not having any anginal symptoms.  Her last cardiac work-up done couple of years ago was within normal limit.  At this time continue aspirin.    Regarding her hypertension, it seems to be very well controlled with Lopressor and Cozaar.  Continue the same.  Her renal functions were normal and electrolytes are normal so continue the same    Regarding her hypercholesterolemia, she is on Lipitor.  She is tolerating it well without any myalgia her lipid profile checked in December was normal.    Regarding metabolic syndrome, had a long talk with her about diet and weight reduction.  I gave her papers on Mediterranean diet    Regarding PAF, she is on Lopressor.  She has been maintaining sinus rhythm for almost 3 years now.  At this time I think we should  be able to stop the Eliquis and continue just on aspirin    Regarding advanced directive, talked to her about living will and power of  and gave her booklets regarding that    Overall cardiac status appears stable.  We will see her back in 6 months or sooner if needed                 Electronically signed by Kris Bourgeois MD May 9, 2023 11:33 EDT

## 2023-05-09 NOTE — PROGRESS NOTES
Chief Complaint   Patient presents with   • Follow-up     Pt is here for cardiac follow up.  Pt denies CP, SOB, dizziness or palpitations.  Pt does take a daily ASA.   • Med Refill     Pt request 90 day refills to be sent to [facility].  Medications were verified by med list.     • Lab Work     Pt states their last labs were in March  with her PCP.       CARDIAC COMPLAINTS  dyspnea        Subjective   Yulia Abebe is a 67 y.o. female came in today for her follow-up visit.  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  After trying with medical management, she did undergo bypass surgery with reimplanting the right coronary artery at .  She did have PAF for which she has been on a blood thinner for almost 3 years now.  She came today with no new symptoms.  She denies having any chest pain or shortness of breath.  She apparently had recent labs but I do not have the report.  The last set of labs I have is from December and that time the cholesterol was normal.  A1c was slightly up at 6.4.  She has not had any more palpitation no more dizziness.  She developed some back pain when she was outside mowing the yard.  She was told it was a bulged disc but things got better.              Cardiac History  Past Surgical History:   Procedure Laterality Date   • APPENDECTOMY     • CARDIAC CATHETERIZATION  10/25/2013    Anomalous origin of RCA, normal coronaries, EF65%   • CARDIAC CATHETERIZATION  02/18/2020    RCA arising from (L) Coronary Cusp.   • CARDIOVASCULAR STRESS TEST  03/14/2012    Stress- 9min, 85% THR./72, negative for ischemia   • CARDIOVASCULAR STRESS TEST  10/24/2013    L.Myoview- ([facility]) small apical infarct versus breast attenuation   • CARDIOVASCULAR STRESS TEST  11/15/2018    L. Cardiolite- Negative. EF 76%.   • CARDIOVASCULAR  STRESS TEST  09/17/2019    L. Cardiolite- EF > 70%. Negative.   • CARDIOVASCULAR STRESS TEST  03/01/2021    EF 71%, no ischemia   • CHOLECYSTECTOMY     • CORONARY ARTERY BYPASS GRAFT  07/20/2020    Dr. Esteban- Reimplant of RCA   • ECHO - CONVERTED  09/18/2010    Echo- ([facility]. Dr. Barr) EF 65%   • ECHO - CONVERTED  09/18/2010    D. Echo- ([facility]. Dr. Barr) Negative.   • ECHO - CONVERTED  03/14/2012    Echo- EF 65-70%   • ECHO - CONVERTED  11/15/2018    EF 55-60%. Mild MR.   • ECHO - CONVERTED  09/17/2019    EF 65%. Trace-Mild MR. RVSP- 22 mmHg.   • ECHO - CONVERTED  03/01/2021    EF 65-70%, normal diastolic function, trace MR   • HYSTERECTOMY     • OTHER SURGICAL HISTORY  02/20/2020    CTA Heart- Anamalous origina nd Course of RCA. Between Aorta and PA       Current Outpatient Medications   Medication Sig Dispense Refill   • aspirin 81 MG EC tablet Take 1 tablet by mouth Daily. 90 tablet 3   • atorvastatin (LIPITOR) 20 MG tablet Take 1 tablet by mouth Daily. 90 tablet 2   • Carboxymethylcellul-Glycerin (Refresh Optive) 0.5-0.9 % solution Apply 1 drop to eye(s) as directed by provider 4 (Four) Times a Day.     • Carboxymethylcellulose Sodium (REFRESH LIQUIGEL OP) Apply 1 drop to eye(s) as directed by provider Every Night.     • Cholecalciferol (VITAMIN D3) 2000 units tablet Take 1 tablet by mouth Daily.     • estradiol (ESTRACE) 0.5 MG tablet Take 0.25 mg by mouth Daily.     • levothyroxine (SYNTHROID, LEVOTHROID) 125 MCG tablet Take 1 tablet by mouth Daily.     • metoprolol tartrate (LOPRESSOR) 50 MG tablet Take 0.5 tablets by mouth 2 (Two) Times a Day. 1/2 tab twice a day 180 tablet 2   • omeprazole (priLOSEC) 40 MG capsule Take 1 capsule by mouth 2 (Two) Times a Day.     • sucralfate (CARAFATE) 1 G tablet Take 1 tablet by mouth 4 (Four) Times a Day.     • losartan (Cozaar) 25 MG tablet Take 1 tablet by mouth Daily. 90 tablet 3     No current facility-administered medications for this visit.       Allergies  :  Sulfa  antibiotics and Penicillins       Past Medical History:   Diagnosis Date   • Abnormal US (ultrasound) of abdomen 11/22/2010    US abdomen. No gallstones. 1.8cm benign cyst right kidney   • Abnormal US (ultrasound) of abdomen 11/22/2010    No gallstones. 1.8cm benign cyst right kidney.   • Anxiety and depression    • Bulging lumbar disc    • Celiac disease    • Dry eye    • H/O: hysterectomy    • History of appendectomy    • History of basal cell cancer     removed   • Hyperlipidemia    • Hyperthyroidism    • Lump in neck 03/2021   • Optional surgery     Adhesions removed as a child       Social History     Socioeconomic History   • Marital status:    Tobacco Use   • Smoking status: Never   • Smokeless tobacco: Never   Vaping Use   • Vaping Use: Never used   Substance and Sexual Activity   • Alcohol use: No   • Drug use: No   • Sexual activity: Defer       Family History   Problem Relation Age of Onset   • Heart disease Father    • Stroke Father    • Hypertension Father    • Heart disease Other    • Stroke Other    • Colon polyps Brother        Review of Systems   Constitutional: Negative for decreased appetite and malaise/fatigue.   HENT: Negative for congestion and sore throat.    Eyes: Negative for blurred vision, double vision and visual disturbance.   Cardiovascular: Negative for chest pain and palpitations.   Respiratory: Negative for shortness of breath and snoring.    Endocrine: Negative for cold intolerance and heat intolerance.   Hematologic/Lymphatic: Negative for adenopathy. Does not bruise/bleed easily.   Skin: Negative for itching, nail changes and skin cancer.   Musculoskeletal: Negative for arthritis and myalgias.   Gastrointestinal: Negative for abdominal pain, dysphagia and heartburn.   Genitourinary: Negative for bladder incontinence and frequency.   Neurological: Negative for dizziness, seizures and vertigo.   Psychiatric/Behavioral: Negative for altered mental status.  "  Allergic/Immunologic: Negative for environmental allergies and hives.       Diabetes- No  Thyroid- abnormal    Objective     /80 (BP Location: Left arm, Patient Position: Sitting)   Pulse 60   Ht 175.3 cm (69\")   Wt 84.3 kg (185 lb 12.8 oz)   BMI 27.44 kg/m²     Vitals and nursing note reviewed.   Constitutional:       Appearance: Healthy appearance. Not in distress.   Eyes:      Conjunctiva/sclera: Conjunctivae normal.      Pupils: Pupils are equal, round, and reactive to light.   HENT:      Head: Normocephalic.   Pulmonary:      Effort: Pulmonary effort is normal.      Breath sounds: Normal breath sounds.   Cardiovascular:      PMI at left midclavicular line. Normal rate. Regular rhythm.   Abdominal:      General: Bowel sounds are normal.      Palpations: Abdomen is soft.   Musculoskeletal: Normal range of motion.      Cervical back: Normal range of motion and neck supple. Skin:     General: Skin is warm and dry.   Neurological:      Mental Status: Alert, oriented to person, place, and time and oriented to person, place and time.       Procedures            @ASSESSMENT/PLAN@  BMI is >= 25 and <30. (Overweight) The following options were offered after discussion;: weight loss educational material (shared in after visit summary), exercise counseling/recommendations and nutrition counseling/recommendations     Diagnoses and all orders for this visit:    1. 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  Dispense: 90 tablet; Refill: 3    2. Essential hypertension  -     metoprolol tartrate (LOPRESSOR) 50 MG tablet; Take 0.5 tablets by mouth 2 (Two) Times a Day. 1/2 tab twice a day  Dispense: 180 tablet; Refill: 2  -     losartan (Cozaar) 25 MG tablet; Take 1 tablet by mouth Daily.  Dispense: 90 tablet; Refill: 3    3. Hypercholesteremia  -     atorvastatin (LIPITOR) 20 MG tablet; Take 1 tablet by mouth Daily.  Dispense: 90 tablet; Refill: " 2    4. Metabolic syndrome    5. PAF (paroxysmal atrial fibrillation)  -     metoprolol tartrate (LOPRESSOR) 50 MG tablet; Take 0.5 tablets by mouth 2 (Two) Times a Day. 1/2 tab twice a day  Dispense: 180 tablet; Refill: 2  -     Discontinue: apixaban (ELIQUIS) 5 MG tablet tablet; Take 1 tablet by mouth Every 12 (Twelve) Hours.  Dispense: 180 tablet; Refill: 2       At baseline her heart rate is stable.  Her blood pressure is borderline elevated.  Her BMI is still up at 27.  Her cardiovascular examination is otherwise unremarkable.  Regarding the anomalous origin of the right coronary artery, she has undergone    Reimplant of the origin.  At this time she is not having any anginal symptoms.  Her last cardiac work-up done couple of years ago was within normal limit.  At this time continue aspirin.    Regarding her hypertension, it seems to be very well controlled with Lopressor and Cozaar.  Continue the same.  Her renal functions were normal and electrolytes are normal so continue the same    Regarding her hypercholesterolemia, she is on Lipitor.  She is tolerating it well without any myalgia her lipid profile checked in December was normal.    Regarding metabolic syndrome, had a long talk with her about diet and weight reduction.  I gave her papers on Mediterranean diet    Regarding PAF, she is on Lopressor.  She has been maintaining sinus rhythm for almost 3 years now.  At this time I think we should be able to stop the Eliquis and continue just on aspirin    Regarding advanced directive, talked to her about living will and power of  and gave her booklets regarding that    Overall cardiac status appears stable.  We will see her back in 6 months or sooner if needed                  Electronically signed by Kris Bourgeois MD May 9, 2023 11:33 EDT

## 2023-05-16 ENCOUNTER — TELEPHONE (OUTPATIENT)
Dept: CARDIOLOGY | Facility: CLINIC | Age: 68
End: 2023-05-16
Payer: COMMERCIAL

## 2023-05-16 NOTE — TELEPHONE ENCOUNTER
Caller: Yulia Abebe    Relationship: Self    Best call back number: [phone]    What is the best time to reach you: ANY    Who are you requesting to speak with (clinical staff, provider,  specific staff member): CLINICAL    What was the call regarding: PT IS CALLING TO LET DR. DELA CRUZ KNOW THAT SHE STOPPEFD THE LOSARATAN AS IT MADE HER SICK THE FIRST THREE DAYS WITH TAKING THE RX. PT REPORTS IT ALSO DROPPING HER BP /59/100/60. SINCE PT HAS STOPPED THE LOSARTAN PTS BP HAS BEEN REGULATED NORMALLY.     Do you require a callback: YES

## 2023-05-17 NOTE — TELEPHONE ENCOUNTER
Pt aware to monitor BP and call with any problems. Understanding voiced. Losartan added to allergy/contraindication list.

## 2023-11-21 ENCOUNTER — TELEPHONE (OUTPATIENT)
Dept: CARDIOLOGY | Facility: CLINIC | Age: 68
End: 2023-11-21
Payer: COMMERCIAL

## 2023-11-21 NOTE — TELEPHONE ENCOUNTER
Caller: Yulia Abebe    Relationship: Self    Best call back number: [phone]     What is the best time to reach you: ANYTIME     Who are you requesting to speak with (clinical staff, provider,  specific staff member): UNKNOWN     Do you know the name of the person who called: UNKNOWN     What was the call regarding: PT REPORTS THAT THEY WERE GIVEN A CALL TODAY  BUT THERE IS NO DOCUMENTATION IN THE CHART ABOUT THIS. IF THIS WAS NOT A MISTAKE, PLEASE CALL PT BACK. PTS APPT HAS ALREADY BEEN CONFIRMED BEFORE THIS CALL    Is it okay if the provider responds through MyChart: CALL

## 2023-11-22 ENCOUNTER — OFFICE VISIT (OUTPATIENT)
Dept: CARDIOLOGY | Facility: CLINIC | Age: 68
End: 2023-11-22
Payer: MEDICARE

## 2023-11-22 VITALS
DIASTOLIC BLOOD PRESSURE: 88 MMHG | WEIGHT: 187.6 LBS | HEIGHT: 69 IN | HEART RATE: 57 BPM | BODY MASS INDEX: 27.78 KG/M2 | SYSTOLIC BLOOD PRESSURE: 132 MMHG

## 2023-11-22 DIAGNOSIS — I48.0 PAF (PAROXYSMAL ATRIAL FIBRILLATION): Primary | ICD-10-CM

## 2023-11-22 DIAGNOSIS — E78.00 HYPERCHOLESTEREMIA: ICD-10-CM

## 2023-11-22 DIAGNOSIS — I10 ESSENTIAL HYPERTENSION: ICD-10-CM

## 2023-11-22 DIAGNOSIS — R06.02 SHORTNESS OF BREATH: ICD-10-CM

## 2023-11-22 DIAGNOSIS — R53.83 OTHER FATIGUE: ICD-10-CM

## 2023-11-22 DIAGNOSIS — R00.2 PALPITATIONS: ICD-10-CM

## 2023-11-22 DIAGNOSIS — R42 DIZZINESS: ICD-10-CM

## 2023-11-22 DIAGNOSIS — E66.3 OVERWEIGHT: ICD-10-CM

## 2023-11-22 RX ORDER — METOPROLOL SUCCINATE 25 MG/1
25 TABLET, EXTENDED RELEASE ORAL NIGHTLY
Qty: 90 TABLET | Refills: 3 | Status: SHIPPED | OUTPATIENT
Start: 2023-11-22

## 2023-11-22 NOTE — PROGRESS NOTES
Chief Complaint   Patient presents with    Follow-up     Pt is here for cardiac follow up.  Pt states she has had some dizziness, palpitaitons and feeling weak.  She denies CP or SOB.  Pt does take a daily ASA.      Med Refill     Pt request 90 day refills to be sent to [facility].  Medications were verified by med list.      Lab Work     Pt states their last labs were 9/19/23 with her PCP- pt brought a copy.         Cardiac Complaints  Dizziness and palpitations      Subjective   Yulia Abebe is a 68 y.o. 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  She has been tried manage medically with increasing beta-blockers and nitrates but the symptoms persisted.  She was referred to the CT surgery service at .  She underwent surgery with reimplanting the RCA to anterior part of the aorta.  Her postoperative course was complicated with PAF for which she underwent JESSY and cardioversion.  She was discharged home on amiodarone and Eliquis.  Patient went to the hospital at [facility] March 2021 with chest pain and weakness. She underwent EKG at that time which showed no acute ST changes, troponin was negative. Stress and echo were recommended. Stress showed no ischemia with good LV function, no effusion noted.  Patient was advised chest pain appeared to be more GI in nature and was advised to follow with GI for EGD. Most recent EGD showed hernia in April 2021, she is followed by Dr. Mirza. Workup 2021 with stress showed EF of 70%, and no ischemia. Echo showed EF 65-70%, no diastolic dysfunction.      She comes today for follow up and admits to dizziness and palpitations,she reports she has been weaker. No CP, SOA, or syncope noted. Labs with PCP 9/2023 and showed: AIC 6.5%, TSH 0.726, HH 14.7/45.4, TRIG 146, HDL 73, LDL 91, ALT 22, AST 21, K 4.9, Na 141, BUN 11, GFR 75, VIT D 40.3.          Cardiac History  Past Surgical  History:   Procedure Laterality Date    APPENDECTOMY      CARDIAC CATHETERIZATION  10/25/2013    Anomalous origin of RCA, normal coronaries, EF65%    CARDIAC CATHETERIZATION  02/18/2020    RCA arising from (L) Coronary Cusp.    CARDIOVASCULAR STRESS TEST  03/14/2012    Stress- 9min, 85% THR./72, negative for ischemia    CARDIOVASCULAR STRESS TEST  10/24/2013    L.Myoview- ([facility]) small apical infarct versus breast attenuation    CARDIOVASCULAR STRESS TEST  11/15/2018    L. Cardiolite- Negative. EF 76%.    CARDIOVASCULAR STRESS TEST  09/17/2019    L. Cardiolite- EF > 70%. Negative.    CARDIOVASCULAR STRESS TEST  03/01/2021    EF 71%, no ischemia    CHOLECYSTECTOMY      CORONARY ARTERY BYPASS GRAFT  07/20/2020    Dr. Esteban- Reimplant of RCA    ECHO - CONVERTED  09/18/2010    Echo- ([facility]. Dr. Barr) EF 65%    ECHO - CONVERTED  09/18/2010    D. Echo- ([facility]. Dr. Barr) Negative.    ECHO - CONVERTED  03/14/2012    Echo- EF 65-70%    ECHO - CONVERTED  11/15/2018    EF 55-60%. Mild MR.    ECHO - CONVERTED  09/17/2019    EF 65%. Trace-Mild MR. RVSP- 22 mmHg.    ECHO - CONVERTED  03/01/2021    EF 65-70%, normal diastolic function, trace MR    HYSTERECTOMY      OTHER SURGICAL HISTORY  02/20/2020    CTA Heart- Anamalous origina nd Course of RCA. Between Aorta and PA       Current Outpatient Medications   Medication Sig Dispense Refill    aspirin 81 MG EC tablet Take 1 tablet by mouth Daily. 90 tablet 3    atorvastatin (LIPITOR) 20 MG tablet Take 1 tablet by mouth Daily. 90 tablet 2    Carboxymethylcellulose Sodium (REFRESH LIQUIGEL OP) Apply 1 drop to eye(s) as directed by provider Every Night.      Cholecalciferol (VITAMIN D3) 2000 units tablet Take 1 tablet by mouth Daily.      estradiol (ESTRACE) 0.5 MG tablet Take 0.5 tablets by mouth Daily.      levothyroxine (SYNTHROID, LEVOTHROID) 125 MCG tablet Take 1 tablet by mouth Daily.      linaclotide (LINZESS) 72 MCG capsule capsule Take 1 capsule by mouth Every Morning  Before Breakfast.      omeprazole (priLOSEC) 40 MG capsule Take 1 capsule by mouth 2 (Two) Times a Day.      sucralfate (CARAFATE) 1 G tablet Take 1 tablet by mouth 4 (Four) Times a Day.      metoprolol succinate XL (TOPROL-XL) 25 MG 24 hr tablet Take 1 tablet by mouth Every Night. 90 tablet 3     No current facility-administered medications for this visit.       Losartan, Sulfa antibiotics, and Penicillins    Past Medical History:   Diagnosis Date    Abnormal US (ultrasound) of abdomen 11/22/2010    US abdomen. No gallstones. 1.8cm benign cyst right kidney    Abnormal US (ultrasound) of abdomen 11/22/2010    No gallstones. 1.8cm benign cyst right kidney.    Anxiety and depression     Bulging lumbar disc     Celiac disease     Dry eye     H/O: hysterectomy     History of appendectomy     History of basal cell cancer     removed    Hyperlipidemia     Hyperthyroidism     Lump in neck 03/2021    Optional surgery     Adhesions removed as a child       Social History     Socioeconomic History    Marital status:    Tobacco Use    Smoking status: Never    Smokeless tobacco: Never   Vaping Use    Vaping Use: Never used   Substance and Sexual Activity    Alcohol use: No    Drug use: No    Sexual activity: Defer       Family History   Problem Relation Age of Onset    Heart disease Father     Stroke Father     Hypertension Father     Heart disease Other     Stroke Other     Colon polyps Brother        Review of Systems   Constitutional: Negative for malaise/fatigue and night sweats.   Cardiovascular:  Positive for palpitations. Negative for chest pain, claudication, dyspnea on exertion, irregular heartbeat, leg swelling, near-syncope, orthopnea and syncope.   Respiratory:  Negative for cough, shortness of breath and wheezing.    Musculoskeletal:  Negative for back pain, joint pain, joint swelling and stiffness.   Gastrointestinal:  Negative for anorexia, heartburn, nausea and vomiting.   Genitourinary:  Negative for  "dysuria, hematuria, hesitancy and nocturia.   Neurological:  Positive for dizziness and light-headedness.           Objective     /88 (BP Location: Left arm, Patient Position: Sitting)   Pulse 57   Ht 175.3 cm (69\")   Wt 85.1 kg (187 lb 9.6 oz)   BMI 27.70 kg/m²     Constitutional:       Appearance: Not in distress.   Eyes:      Pupils: Pupils are equal, round, and reactive to light.   HENT:      Nose: Nose normal.   Pulmonary:      Effort: Pulmonary effort is normal.      Breath sounds: Normal breath sounds.   Cardiovascular:      PMI at left midclavicular line. Bradycardia present. Regular rhythm.      Murmurs: There is a systolic murmur.   Abdominal:      Palpations: Abdomen is soft.   Musculoskeletal: Normal range of motion.      Cervical back: Normal range of motion and neck supple. Skin:     General: Skin is warm and dry.   Neurological:      Mental Status: Alert.           ECG 12 Lead    Date/Time: 11/22/2023 9:54 AM  Performed by: Martina Witt APRN    Authorized by: Martina Witt APRN  Comparison: compared with previous ECG from 10/18/2022  Similar to previous ECG  Rhythm: sinus bradycardia  BPM: 57    Clinical impression: abnormal EKG  Comments: Normal QT               Diagnoses and all orders for this visit:    1. PAF (paroxysmal atrial fibrillation) (Primary)  -     ECG 12 Lead  -     Adult Transthoracic Echo Complete W/ Cont if Necessary Per Protocol; Future  -     Holter Monitor - 72 Hour Up To 15 Days; Future    2. Palpitations  -     Adult Transthoracic Echo Complete W/ Cont if Necessary Per Protocol; Future  -     Holter Monitor - 72 Hour Up To 15 Days; Future    3. Shortness of breath  -     Adult Transthoracic Echo Complete W/ Cont if Necessary Per Protocol; Future    4. Essential hypertension    5. Hypercholesteremia    6. Dizziness    7. Other fatigue    8. Overweight    Other orders  -     metoprolol succinate XL (TOPROL-XL) 25 MG 24 hr tablet; Take 1 tablet by mouth Every " Night.  Dispense: 90 tablet; Refill: 3             PAF:  EKG done today in regards shows continued sinus candelario with normal QT. Fatigue noted, will d/c the lopressor and change to extended release nightly at 25mg XL.  Eliquis continued at the same, bleeding and bruising denied. Holter recommended for 5 days to rule out any more frequent PAF.     HTN:  Blood pressure stable. Same metoprolol therapy will be continued. Limited sodium advised.     Cardiac status: Fatigue and SOA noted, will urge on repeat echo to assess LV function/valve areas. Continue ASA     Hyperlipidemia:  On statin therapy with lipitor, tolerance reported.  Same continued. FLP followed by your office, current showed lipids at goal, continue same. Limited carb diet urged.      BMI noted at 27.7, good cardiac diet with limited carbs, calories, and activity as tolerated advised.    More recommendations to follow testing.     Refills per request.     6 month follow up recommended or sooner if needed.              Problems Addressed this Visit          Cardiac and Vasculature    Essential hypertension    Relevant Medications    metoprolol succinate XL (TOPROL-XL) 25 MG 24 hr tablet    Hypercholesteremia    Palpitations    Relevant Orders    Adult Transthoracic Echo Complete W/ Cont if Necessary Per Protocol    Holter Monitor - 72 Hour Up To 15 Days    PAF (paroxysmal atrial fibrillation) - Primary    Relevant Medications    metoprolol succinate XL (TOPROL-XL) 25 MG 24 hr tablet    Other Relevant Orders    ECG 12 Lead    Adult Transthoracic Echo Complete W/ Cont if Necessary Per Protocol    Holter Monitor - 72 Hour Up To 15 Days     Other Visit Diagnoses       Shortness of breath        Relevant Orders    Adult Transthoracic Echo Complete W/ Cont if Necessary Per Protocol    Dizziness        Other fatigue        Overweight              Diagnoses         Codes Comments    PAF (paroxysmal atrial fibrillation)    -  Primary ICD-10-CM: I48.0  ICD-9-CM:  427.31     Palpitations     ICD-10-CM: R00.2  ICD-9-CM: 785.1     Shortness of breath     ICD-10-CM: R06.02  ICD-9-CM: 786.05     Essential hypertension     ICD-10-CM: I10  ICD-9-CM: 401.9     Hypercholesteremia     ICD-10-CM: E78.00  ICD-9-CM: 272.0     Dizziness     ICD-10-CM: R42  ICD-9-CM: 780.4     Other fatigue     ICD-10-CM: R53.83  ICD-9-CM: 780.79     Overweight     ICD-10-CM: E66.3  ICD-9-CM: 278.02                        Electronically signed by Martina Witt, APRN November 22, 2023 17:15 EST

## 2023-12-06 ENCOUNTER — HOSPITAL ENCOUNTER (OUTPATIENT)
Dept: CARDIOLOGY | Facility: HOSPITAL | Age: 68
Discharge: HOME | End: 2023-12-06
Admitting: NURSE PRACTITIONER
Payer: MEDICARE

## 2023-12-06 DIAGNOSIS — R06.02 SHORTNESS OF BREATH: ICD-10-CM

## 2023-12-06 DIAGNOSIS — R00.2 PALPITATIONS: ICD-10-CM

## 2023-12-06 DIAGNOSIS — I48.0 PAF (PAROXYSMAL ATRIAL FIBRILLATION): ICD-10-CM

## 2023-12-06 LAB
BH CV ECHO MEAS - ACS: 2.16 CM
BH CV ECHO MEAS - AO MAX PG: 8.3 MMHG
BH CV ECHO MEAS - AO MEAN PG: 4 MMHG
BH CV ECHO MEAS - AO ROOT DIAM: 2.9 CM
BH CV ECHO MEAS - AO V2 MAX: 144 CM/SEC
BH CV ECHO MEAS - AO V2 VTI: 29 CM
BH CV ECHO MEAS - EDV(CUBED): 98 ML
BH CV ECHO MEAS - EDV(MOD-SP4): 73.8 ML
BH CV ECHO MEAS - EF(MOD-SP4): 66.1 %
BH CV ECHO MEAS - EF_3D-VOL: 74 %
BH CV ECHO MEAS - ESV(CUBED): 12.6 ML
BH CV ECHO MEAS - ESV(MOD-SP4): 25 ML
BH CV ECHO MEAS - FS: 49.5 %
BH CV ECHO MEAS - IVS/LVPW: 0.9 CM
BH CV ECHO MEAS - IVSD: 1 CM
BH CV ECHO MEAS - LA DIMENSION: 4 CM
BH CV ECHO MEAS - LAT PEAK E' VEL: 10.7 CM/SEC
BH CV ECHO MEAS - LV DIASTOLIC VOL/BSA (35-75): 36.8 CM2
BH CV ECHO MEAS - LV MASS(C)D: 171.6 GRAMS
BH CV ECHO MEAS - LV SYSTOLIC VOL/BSA (12-30): 12.5 CM2
BH CV ECHO MEAS - LVIDD: 4.6 CM
BH CV ECHO MEAS - LVIDS: 2.33 CM
BH CV ECHO MEAS - LVPWD: 1.11 CM
BH CV ECHO MEAS - MED PEAK E' VEL: 5.4 CM/SEC
BH CV ECHO MEAS - MV A MAX VEL: 87.4 CM/SEC
BH CV ECHO MEAS - MV DEC TIME: 0.22 SEC
BH CV ECHO MEAS - MV E MAX VEL: 93.8 CM/SEC
BH CV ECHO MEAS - MV E/A: 1.07
BH CV ECHO MEAS - RAP SYSTOLE: 10 MMHG
BH CV ECHO MEAS - RVDD: 2.8 CM
BH CV ECHO MEAS - RVSP: 25.9 MMHG
BH CV ECHO MEAS - SI(MOD-SP4): 24.3 ML/M2
BH CV ECHO MEAS - SV(MOD-SP4): 48.8 ML
BH CV ECHO MEAS - TR MAX PG: 15.9 MMHG
BH CV ECHO MEAS - TR MAX VEL: 199.5 CM/SEC
BH CV ECHO MEASUREMENTS AVERAGE E/E' RATIO: 11.65
BH CV XLRA - RV BASE: 3.5 CM
BH CV XLRA - RV LENGTH: 7.8 CM
BH CV XLRA - RV MID: 2.6 CM
LEFT ATRIUM VOLUME INDEX: 18.3 ML/M2

## 2023-12-06 PROCEDURE — 93306 TTE W/DOPPLER COMPLETE: CPT

## 2024-02-15 ENCOUNTER — TELEPHONE (OUTPATIENT)
Dept: CARDIOLOGY | Facility: CLINIC | Age: 69
End: 2024-02-15
Payer: COMMERCIAL

## 2024-02-15 DIAGNOSIS — E87.6 HYPOKALEMIA: ICD-10-CM

## 2024-02-15 DIAGNOSIS — I20.89 ANGINAL EQUIVALENT: ICD-10-CM

## 2024-02-15 DIAGNOSIS — R55 PRE-SYNCOPE: Primary | ICD-10-CM

## 2024-02-15 DIAGNOSIS — R07.89 CHEST TIGHTNESS: ICD-10-CM

## 2024-02-21 ENCOUNTER — HOSPITAL ENCOUNTER (OUTPATIENT)
Dept: CARDIOLOGY | Facility: HOSPITAL | Age: 69
Discharge: HOME | End: 2024-02-21
Payer: COMMERCIAL

## 2024-02-21 ENCOUNTER — LAB (OUTPATIENT)
Dept: LAB | Facility: HOSPITAL | Age: 69
End: 2024-02-21
Payer: MEDICARE

## 2024-02-21 DIAGNOSIS — R55 PRE-SYNCOPE: ICD-10-CM

## 2024-02-21 DIAGNOSIS — I20.89 ANGINAL EQUIVALENT: ICD-10-CM

## 2024-02-21 DIAGNOSIS — E87.6 HYPOKALEMIA: ICD-10-CM

## 2024-02-21 DIAGNOSIS — R07.89 CHEST TIGHTNESS: ICD-10-CM

## 2024-02-21 LAB
ALBUMIN SERPL-MCNC: 4.5 G/DL (ref 3.5–5.2)
ALBUMIN/GLOB SERPL: 1.6 G/DL
ALP SERPL-CCNC: 76 U/L (ref 39–117)
ALT SERPL W P-5'-P-CCNC: 16 U/L (ref 1–33)
ANION GAP SERPL CALCULATED.3IONS-SCNC: 12.4 MMOL/L (ref 5–15)
AST SERPL-CCNC: 15 U/L (ref 1–32)
BH CV REST NUCLEAR ISOTOPE DOSE: 10 MCI
BH CV STRESS COMMENTS STAGE 1: NORMAL
BH CV STRESS DOSE REGADENOSON STAGE 1: 0.4
BH CV STRESS DURATION MIN STAGE 1: 0
BH CV STRESS DURATION SEC STAGE 1: 10
BH CV STRESS NUCLEAR ISOTOPE DOSE: 30 MCI
BH CV STRESS PROTOCOL 1: NORMAL
BH CV STRESS RECOVERY BP: NORMAL MMHG
BH CV STRESS RECOVERY HR: 70 BPM
BH CV STRESS STAGE 1: 1
BILIRUB SERPL-MCNC: 0.4 MG/DL (ref 0–1.2)
BUN SERPL-MCNC: 14 MG/DL (ref 8–23)
BUN/CREAT SERPL: 16.1 (ref 7–25)
CALCIUM SPEC-SCNC: 9.8 MG/DL (ref 8.6–10.5)
CHLORIDE SERPL-SCNC: 103 MMOL/L (ref 98–107)
CO2 SERPL-SCNC: 27.6 MMOL/L (ref 22–29)
CREAT SERPL-MCNC: 0.87 MG/DL (ref 0.57–1)
EGFRCR SERPLBLD CKD-EPI 2021: 73.1 ML/MIN/1.73
GLOBULIN UR ELPH-MCNC: 2.8 GM/DL
GLUCOSE SERPL-MCNC: 114 MG/DL (ref 65–99)
LV EF NUC BP: 85 %
MAXIMAL PREDICTED HEART RATE: 153 BPM
PERCENT MAX PREDICTED HR: 52.94 %
POTASSIUM SERPL-SCNC: 5 MMOL/L (ref 3.5–5.2)
PROT SERPL-MCNC: 7.3 G/DL (ref 6–8.5)
SODIUM SERPL-SCNC: 143 MMOL/L (ref 136–145)
STRESS BASELINE BP: NORMAL MMHG
STRESS BASELINE HR: 70 BPM
STRESS PERCENT HR: 62 %
STRESS POST PEAK BP: NORMAL MMHG
STRESS POST PEAK HR: 81 BPM
STRESS TARGET HR: 130 BPM

## 2024-02-21 PROCEDURE — 36415 COLL VENOUS BLD VENIPUNCTURE: CPT

## 2024-02-21 PROCEDURE — A9500 TC99M SESTAMIBI: HCPCS | Performed by: INTERNAL MEDICINE

## 2024-02-21 PROCEDURE — 93017 CV STRESS TEST TRACING ONLY: CPT

## 2024-02-21 PROCEDURE — 0 TECHNETIUM SESTAMIBI: Performed by: INTERNAL MEDICINE

## 2024-02-21 PROCEDURE — 78452 HT MUSCLE IMAGE SPECT MULT: CPT | Performed by: INTERNAL MEDICINE

## 2024-02-21 PROCEDURE — 93018 CV STRESS TEST I&R ONLY: CPT | Performed by: INTERNAL MEDICINE

## 2024-02-21 PROCEDURE — 25010000002 REGADENOSON 0.4 MG/5ML SOLUTION: Performed by: INTERNAL MEDICINE

## 2024-02-21 PROCEDURE — 80053 COMPREHEN METABOLIC PANEL: CPT

## 2024-02-21 PROCEDURE — 78452 HT MUSCLE IMAGE SPECT MULT: CPT

## 2024-02-21 RX ORDER — REGADENOSON 0.08 MG/ML
0.4 INJECTION, SOLUTION INTRAVENOUS
Status: COMPLETED | OUTPATIENT
Start: 2024-02-21 | End: 2024-02-21

## 2024-02-21 RX ADMIN — REGADENOSON 0.4 MG: 0.08 INJECTION, SOLUTION INTRAVENOUS at 10:05

## 2024-02-21 RX ADMIN — TECHNETIUM TC 99M SESTAMIBI 1 DOSE: 1 INJECTION INTRAVENOUS at 09:00

## 2024-02-21 RX ADMIN — TECHNETIUM TC 99M SESTAMIBI 1 DOSE: 1 INJECTION INTRAVENOUS at 10:05

## 2024-04-01 ENCOUNTER — OFFICE VISIT (OUTPATIENT)
Dept: GASTROENTEROLOGY | Facility: CLINIC | Age: 69
End: 2024-04-01
Payer: MEDICARE

## 2024-04-01 VITALS — HEIGHT: 69 IN | BODY MASS INDEX: 27.7 KG/M2 | WEIGHT: 187 LBS

## 2024-04-01 DIAGNOSIS — Z80.0 FAMILY HISTORY OF GI MALIGNANCY: ICD-10-CM

## 2024-04-01 DIAGNOSIS — R10.30 LOWER ABDOMINAL PAIN: Primary | ICD-10-CM

## 2024-04-01 PROCEDURE — 1160F RVW MEDS BY RX/DR IN RCRD: CPT | Performed by: INTERNAL MEDICINE

## 2024-04-01 PROCEDURE — 99214 OFFICE O/P EST MOD 30 MIN: CPT | Performed by: INTERNAL MEDICINE

## 2024-04-01 PROCEDURE — 1159F MED LIST DOCD IN RCRD: CPT | Performed by: INTERNAL MEDICINE

## 2024-04-01 NOTE — LETTER
April 1, 2024       No Recipients    Patient: Yulia Abebe   YOB: 1955   Date of Visit: 4/1/2024     Dear SRIKANTH Bergeron:       Thank you for referring Yulia Abebe to me for evaluation. Below are the relevant portions of my assessment and plan of care.    If you have questions, please do not hesitate to call me. I look forward to following Yulia along with you.         Sincerely,        Ernesto Mirza MD        CC:   No Recipients    Ernesto Mirza MD  04/01/24 1457  Sign when Signing Visit  PCP:  Carissa Montejo APRN     No referring provider defined for this encounter.    Chief Complaint   Patient presents with   • Follow-up        HPI   The patient is a 68-year-old known to me with a history of celiac disease.  She has been gluten-free.  Her maternal uncle and she believes her brother had colon cancer.  She had another brother with polyps.  Her last colonoscopy was 4/9/2019.  There was fixation in the sigmoid and internal hemorrhoids but no polyps were seen and no diverticuli were mentioned.  A 5-year follow-up was suggested.  Recent blood work in the form of CMP was reviewed.  Her blood sugar was slightly elevated at 114.  Her last upper endoscopy 4/5/2021 showed no celiac on biopsies.  Initially she was diagnosed from a upper endoscopy 12/12/2018 where she had changes of celiac on biopsies.  The previous serologies I see in the computer were negative.  The lower abdominal pain has been going on for few months but it has been worse the last 3 to 4 days.  She is tender lower abdomen and is not constant.    Allergies   Allergen Reactions   • Losartan Nausea And Vomiting   • Sulfa Antibiotics Swelling   • Penicillins Rash          Current Outpatient Medications:   •  aspirin 81 MG EC tablet, Take 1 tablet by mouth Daily., Disp: 90 tablet, Rfl: 3  •  atorvastatin (LIPITOR) 20 MG tablet, Take 1 tablet by mouth Daily., Disp: 90 tablet, Rfl: 2  •   Carboxymethylcellulose Sodium (REFRESH LIQUIGEL OP), Apply 1 drop to eye(s) as directed by provider Every Night., Disp: , Rfl:   •  Cholecalciferol (VITAMIN D3) 2000 units tablet, Take 1 tablet by mouth Daily., Disp: , Rfl:   •  estradiol (ESTRACE) 0.5 MG tablet, Take 0.5 tablets by mouth Daily., Disp: , Rfl:   •  levothyroxine (SYNTHROID, LEVOTHROID) 125 MCG tablet, Take 1 tablet by mouth Daily., Disp: , Rfl:   •  linaclotide (LINZESS) 72 MCG capsule capsule, Take 1 capsule by mouth Every Morning Before Breakfast., Disp: , Rfl:   •  metoprolol succinate XL (TOPROL-XL) 25 MG 24 hr tablet, Take 1 tablet by mouth Every Night., Disp: 90 tablet, Rfl: 3  •  omeprazole (priLOSEC) 40 MG capsule, Take 1 capsule by mouth 2 (Two) Times a Day., Disp: , Rfl:   •  sucralfate (CARAFATE) 1 G tablet, Take 1 tablet by mouth 4 (Four) Times a Day., Disp: , Rfl:      Past Medical History:   Diagnosis Date   • Abnormal US (ultrasound) of abdomen 11/22/2010    US abdomen. No gallstones. 1.8cm benign cyst right kidney   • Abnormal US (ultrasound) of abdomen 11/22/2010    No gallstones. 1.8cm benign cyst right kidney.   • Anxiety and depression    • Bulging lumbar disc    • Celiac disease    • Dry eye    • H/O: hysterectomy    • History of appendectomy    • History of basal cell cancer     removed   • Hyperlipidemia    • Hyperthyroidism    • Lump in neck 03/2021   • Optional surgery     Adhesions removed as a child       Past Surgical History:   Procedure Laterality Date   • APPENDECTOMY     • CARDIAC CATHETERIZATION  10/25/2013    Anomalous origin of RCA, normal coronaries, EF65%   • CARDIAC CATHETERIZATION  02/18/2020    RCA arising from (L) Coronary Cusp.   • CARDIOVASCULAR STRESS TEST  03/14/2012    Stress- 9min, 85% THR./72, negative for ischemia   • CARDIOVASCULAR STRESS TEST  10/24/2013    L.Myoview- ([facility]) small apical infarct versus breast attenuation   • CARDIOVASCULAR STRESS TEST  11/15/2018    L. Cardiolite- Negative. EF  76%.   • CARDIOVASCULAR STRESS TEST  09/17/2019    L. Cardiolite- EF > 70%. Negative.   • CARDIOVASCULAR STRESS TEST  03/01/2021    EF 71%, no ischemia   • CARDIOVASCULAR STRESS TEST  02/21/2024    Lexiscan- EF > 70%. Negative   • CHOLECYSTECTOMY     • CONVERTED (HISTORICAL) HOLTER  12/12/2023    5 Days. Avg 69. . One PAT   • CORONARY ARTERY BYPASS GRAFT  07/20/2020    Dr. Esteban- Reimplant of RCA   • ECHO - CONVERTED  09/18/2010    Echo- ([facility]. Dr. Barr) EF 65%   • ECHO - CONVERTED  09/18/2010    D. Echo- ([facility]. Dr. Barr) Negative.   • ECHO - CONVERTED  03/14/2012    Echo- EF 65-70%   • ECHO - CONVERTED  11/15/2018    EF 55-60%. Mild MR.   • ECHO - CONVERTED  09/17/2019    EF 65%. Trace-Mild MR. RVSP- 22 mmHg.   • ECHO - CONVERTED  03/01/2021    EF 65-70%, normal diastolic function, trace MR   • ECHO - CONVERTED  12/06/2023    TLS. EF 65%. LA- 4.0. Trace MR. RVSP- 26 mmHg   • HYSTERECTOMY     • OTHER SURGICAL HISTORY  02/20/2020    CTA Heart- Anamalous origina nd Course of RCA. Between Aorta and PA        Social History     Socioeconomic History   • Marital status:    Tobacco Use   • Smoking status: Never   • Smokeless tobacco: Never   Vaping Use   • Vaping status: Never Used   Substance and Sexual Activity   • Alcohol use: No   • Drug use: No   • Sexual activity: Defer        Family History   Problem Relation Age of Onset   • Heart disease Father    • Stroke Father    • Hypertension Father    • Heart disease Other    • Stroke Other    • Colon polyps Brother         Review of Systems     There were no vitals filed for this visit.     Physical Exam  Constitutional:       General: She is not in acute distress.     Appearance: Normal appearance. She is not ill-appearing.   Abdominal:      General: Abdomen is flat. There is no distension.      Palpations: Abdomen is soft. There is no mass.      Tenderness: There is abdominal tenderness. There is no guarding or rebound.   Neurological:      Mental Status:  She is alert.          Diagnoses and all orders for this visit:    1. Lower abdominal pain (Primary)  -     CT Abdomen Pelvis With Contrast; Future    2. Family history of GI malignancy    Impressions and plan #1 lower abdominal pain: She does have lower abdominal pain and is tender to palpation.  She does not have any guarding.  There does not appear to be any rebound.  I am going to get a CAT scan to be sure she does not have diverticulitis.  I do not think so if she has not had diverticulosis on colonoscopy in the past.  If this is negative we will plan a colonoscopy.  She does have a family history of GI malignancy and she needs a colonoscopy anyway from a screening standpoint.  Will call us with worsening.    Ernesto Mirza MD

## 2024-04-01 NOTE — PROGRESS NOTES
PCP:  Carissa Montejo APRN     No referring provider defined for this encounter.    Chief Complaint   Patient presents with    Follow-up        HPI   The patient is a 68-year-old known to me with a history of celiac disease.  She has been gluten-free.  Her maternal uncle and she believes her brother had colon cancer.  She had another brother with polyps.  Her last colonoscopy was 4/9/2019.  There was fixation in the sigmoid and internal hemorrhoids but no polyps were seen and no diverticuli were mentioned.  A 5-year follow-up was suggested.  Recent blood work in the form of CMP was reviewed.  Her blood sugar was slightly elevated at 114.  Her last upper endoscopy 4/5/2021 showed no celiac on biopsies.  Initially she was diagnosed from a upper endoscopy 12/12/2018 where she had changes of celiac on biopsies.  The previous serologies I see in the computer were negative.  The lower abdominal pain has been going on for few months but it has been worse the last 3 to 4 days.  She is tender lower abdomen and is not constant.    Allergies   Allergen Reactions    Losartan Nausea And Vomiting    Sulfa Antibiotics Swelling    Penicillins Rash          Current Outpatient Medications:     aspirin 81 MG EC tablet, Take 1 tablet by mouth Daily., Disp: 90 tablet, Rfl: 3    atorvastatin (LIPITOR) 20 MG tablet, Take 1 tablet by mouth Daily., Disp: 90 tablet, Rfl: 2    Carboxymethylcellulose Sodium (REFRESH LIQUIGEL OP), Apply 1 drop to eye(s) as directed by provider Every Night., Disp: , Rfl:     Cholecalciferol (VITAMIN D3) 2000 units tablet, Take 1 tablet by mouth Daily., Disp: , Rfl:     estradiol (ESTRACE) 0.5 MG tablet, Take 0.5 tablets by mouth Daily., Disp: , Rfl:     levothyroxine (SYNTHROID, LEVOTHROID) 125 MCG tablet, Take 1 tablet by mouth Daily., Disp: , Rfl:     linaclotide (LINZESS) 72 MCG capsule capsule, Take 1 capsule by mouth Every Morning Before Breakfast., Disp: , Rfl:     metoprolol succinate XL  (TOPROL-XL) 25 MG 24 hr tablet, Take 1 tablet by mouth Every Night., Disp: 90 tablet, Rfl: 3    omeprazole (priLOSEC) 40 MG capsule, Take 1 capsule by mouth 2 (Two) Times a Day., Disp: , Rfl:     sucralfate (CARAFATE) 1 G tablet, Take 1 tablet by mouth 4 (Four) Times a Day., Disp: , Rfl:      Past Medical History:   Diagnosis Date    Abnormal US (ultrasound) of abdomen 11/22/2010    US abdomen. No gallstones. 1.8cm benign cyst right kidney    Abnormal US (ultrasound) of abdomen 11/22/2010    No gallstones. 1.8cm benign cyst right kidney.    Anxiety and depression     Bulging lumbar disc     Celiac disease     Dry eye     H/O: hysterectomy     History of appendectomy     History of basal cell cancer     removed    Hyperlipidemia     Hyperthyroidism     Lump in neck 03/2021    Optional surgery     Adhesions removed as a child       Past Surgical History:   Procedure Laterality Date    APPENDECTOMY      CARDIAC CATHETERIZATION  10/25/2013    Anomalous origin of RCA, normal coronaries, EF65%    CARDIAC CATHETERIZATION  02/18/2020    RCA arising from (L) Coronary Cusp.    CARDIOVASCULAR STRESS TEST  03/14/2012    Stress- 9min, 85% THR./72, negative for ischemia    CARDIOVASCULAR STRESS TEST  10/24/2013    L.Myoview- ([facility]) small apical infarct versus breast attenuation    CARDIOVASCULAR STRESS TEST  11/15/2018    L. Cardiolite- Negative. EF 76%.    CARDIOVASCULAR STRESS TEST  09/17/2019    L. Cardiolite- EF > 70%. Negative.    CARDIOVASCULAR STRESS TEST  03/01/2021    EF 71%, no ischemia    CARDIOVASCULAR STRESS TEST  02/21/2024    Lexiscan- EF > 70%. Negative    CHOLECYSTECTOMY      CONVERTED (HISTORICAL) HOLTER  12/12/2023    5 Days. Avg 69. . One PAT    CORONARY ARTERY BYPASS GRAFT  07/20/2020    Dr. Esteban- Reimplant of RCA    [facility] - CON[facility]ED  09[facility]18[facility]2010    [facility] 65%    ECHO - CONVERTED  09/18/2010    D. Echo- ([facility]. Dr. Barr) Negative.    ECHO - CONVERTED  03/14/2012    Echo- EF  65-70%    ECHO - CONVERTED  11/15/2018    EF 55-60%. Mild MR.    ECHO - CONVERTED  09/17/2019    EF 65%. Trace-Mild MR. RVSP- 22 mmHg.    ECHO - CONVERTED  03/01/2021    EF 65-70%, normal diastolic function, trace MR    ECHO - CONVERTED  12/06/2023    TLS. EF 65%. LA- 4.0. Trace MR. RVSP- 26 mmHg    HYSTERECTOMY      OTHER SURGICAL HISTORY  02/20/2020    CTA Heart- Anamalous origina nd Course of RCA. Between Aorta and PA        Social History     Socioeconomic History    Marital status:    Tobacco Use    Smoking status: Never    Smokeless tobacco: Never   Vaping Use    Vaping status: Never Used   Substance and Sexual Activity    Alcohol use: No    Drug use: No    Sexual activity: Defer        Family History   Problem Relation Age of Onset    Heart disease Father     Stroke Father     Hypertension Father     Heart disease Other     Stroke Other     Colon polyps Brother         Review of Systems     There were no vitals filed for this visit.     Physical Exam  Constitutional:       General: She is not in acute distress.     Appearance: Normal appearance. She is not ill-appearing.   Abdominal:      General: Abdomen is flat. There is no distension.      Palpations: Abdomen is soft. There is no mass.      Tenderness: There is abdominal tenderness. There is no guarding or rebound.   Neurological:      Mental Status: She is alert.          Diagnoses and all orders for this visit:    1. Lower abdominal pain (Primary)  -     CT Abdomen Pelvis With Contrast; Future    2. Family history of GI malignancy    Impressions and plan #1 lower abdominal pain: She does have lower abdominal pain and is tender to palpation.  She does not have any guarding.  There does not appear to be any rebound.  I am going to get a CAT scan to be sure she does not have diverticulitis.  I do not think so if she has not had diverticulosis on colonoscopy in the past.  If this is negative we will plan a colonoscopy.  She does have a family history  of GI malignancy and she needs a colonoscopy anyway from a screening standpoint.  Will call us with worsening.    Ernesto Mirza MD

## 2024-04-04 DIAGNOSIS — E78.00 HYPERCHOLESTEREMIA: ICD-10-CM

## 2024-04-08 ENCOUNTER — HOSPITAL ENCOUNTER (OUTPATIENT)
Dept: CT IMAGING | Facility: HOSPITAL | Age: 69
Discharge: HOME | End: 2024-04-08
Admitting: INTERNAL MEDICINE
Payer: MEDICARE

## 2024-04-08 DIAGNOSIS — R10.30 LOWER ABDOMINAL PAIN: ICD-10-CM

## 2024-04-08 PROCEDURE — 74177 CT ABD & PELVIS W/CONTRAST: CPT

## 2024-04-08 PROCEDURE — 25510000001 IOPAMIDOL 61 % SOLUTION: Performed by: INTERNAL MEDICINE

## 2024-04-08 RX ORDER — ATORVASTATIN CALCIUM 20 MG/1
20 TABLET, FILM COATED ORAL DAILY
Qty: 60 TABLET | Refills: 0 | Status: SHIPPED | OUTPATIENT
Start: 2024-04-08

## 2024-04-08 RX ADMIN — IOPAMIDOL 85 ML: 612 INJECTION, SOLUTION INTRAVENOUS at 14:45

## 2024-04-11 RX ORDER — SODIUM, POTASSIUM,MAG SULFATES 17.5-3.13G
SOLUTION, RECONSTITUTED, ORAL ORAL
Qty: 354 ML | Refills: 0 | Status: SHIPPED | OUTPATIENT
Start: 2024-04-11

## 2024-05-01 RX ORDER — SODIUM, POTASSIUM,MAG SULFATES 17.5-3.13G
SOLUTION, RECONSTITUTED, ORAL ORAL
Qty: 354 ML | Refills: 0 | Status: SHIPPED | OUTPATIENT
Start: 2024-05-01

## 2024-05-08 ENCOUNTER — TELEPHONE (OUTPATIENT)
Dept: CARDIOLOGY | Facility: CLINIC | Age: 69
End: 2024-05-08
Payer: MEDICARE

## 2024-05-08 ENCOUNTER — TELEPHONE (OUTPATIENT)
Dept: GASTROENTEROLOGY | Facility: CLINIC | Age: 69
End: 2024-05-08
Payer: MEDICARE

## 2024-05-16 ENCOUNTER — LAB (OUTPATIENT)
Dept: LAB | Facility: HOSPITAL | Age: 69
End: 2024-05-16
Payer: MEDICARE

## 2024-05-16 ENCOUNTER — OFFICE VISIT (OUTPATIENT)
Dept: CARDIOLOGY | Facility: CLINIC | Age: 69
End: 2024-05-16
Payer: MEDICARE

## 2024-05-16 VITALS
HEART RATE: 64 BPM | WEIGHT: 189.6 LBS | HEIGHT: 69 IN | DIASTOLIC BLOOD PRESSURE: 72 MMHG | SYSTOLIC BLOOD PRESSURE: 136 MMHG | BODY MASS INDEX: 28.08 KG/M2

## 2024-05-16 DIAGNOSIS — E87.6 HYPOKALEMIA: ICD-10-CM

## 2024-05-16 DIAGNOSIS — F41.9 ANXIETY: ICD-10-CM

## 2024-05-16 DIAGNOSIS — S97.82XS: ICD-10-CM

## 2024-05-16 DIAGNOSIS — S97.102S: ICD-10-CM

## 2024-05-16 DIAGNOSIS — E78.00 HYPERCHOLESTEREMIA: ICD-10-CM

## 2024-05-16 DIAGNOSIS — I10 ESSENTIAL HYPERTENSION: Primary | ICD-10-CM

## 2024-05-16 DIAGNOSIS — I20.89 ANGINAL EQUIVALENT: ICD-10-CM

## 2024-05-16 DIAGNOSIS — S97.02XS: ICD-10-CM

## 2024-05-16 DIAGNOSIS — I48.0 PAF (PAROXYSMAL ATRIAL FIBRILLATION): ICD-10-CM

## 2024-05-16 DIAGNOSIS — E66.3 OVERWEIGHT: ICD-10-CM

## 2024-05-16 LAB
ALBUMIN SERPL-MCNC: 4.4 G/DL (ref 3.5–5.2)
ALBUMIN/GLOB SERPL: 1.5 G/DL
ALP SERPL-CCNC: 85 U/L (ref 39–117)
ALT SERPL W P-5'-P-CCNC: 35 U/L (ref 1–33)
ANION GAP SERPL CALCULATED.3IONS-SCNC: 14.5 MMOL/L (ref 5–15)
AST SERPL-CCNC: 19 U/L (ref 1–32)
BILIRUB SERPL-MCNC: 0.5 MG/DL (ref 0–1.2)
BUN SERPL-MCNC: 14 MG/DL (ref 8–23)
BUN/CREAT SERPL: 16.5 (ref 7–25)
CALCIUM SPEC-SCNC: 9.7 MG/DL (ref 8.6–10.5)
CHLORIDE SERPL-SCNC: 100 MMOL/L (ref 98–107)
CO2 SERPL-SCNC: 27.5 MMOL/L (ref 22–29)
CREAT SERPL-MCNC: 0.85 MG/DL (ref 0.57–1)
EGFRCR SERPLBLD CKD-EPI 2021: 75.2 ML/MIN/1.73
GLOBULIN UR ELPH-MCNC: 3 GM/DL
GLUCOSE SERPL-MCNC: 112 MG/DL (ref 65–99)
POTASSIUM SERPL-SCNC: 4.2 MMOL/L (ref 3.5–5.2)
PROT SERPL-MCNC: 7.4 G/DL (ref 6–8.5)
SODIUM SERPL-SCNC: 142 MMOL/L (ref 136–145)

## 2024-05-16 PROCEDURE — 36415 COLL VENOUS BLD VENIPUNCTURE: CPT

## 2024-05-16 PROCEDURE — 80053 COMPREHEN METABOLIC PANEL: CPT

## 2024-05-16 RX ORDER — ATORVASTATIN CALCIUM 20 MG/1
20 TABLET, FILM COATED ORAL DAILY
Qty: 90 TABLET | Refills: 2 | Status: SHIPPED | OUTPATIENT
Start: 2024-05-16

## 2024-05-16 RX ORDER — METOPROLOL SUCCINATE 25 MG/1
25 TABLET, EXTENDED RELEASE ORAL NIGHTLY
Qty: 90 TABLET | Refills: 3 | Status: SHIPPED | OUTPATIENT
Start: 2024-05-16

## 2024-05-16 RX ORDER — PREDNISONE 10 MG/1
TABLET ORAL
COMMUNITY
Start: 2024-05-10 | End: 2024-05-17

## 2024-05-16 NOTE — PROGRESS NOTES
Chief Complaint   Patient presents with    Follow-up     Pt is here for cardiac follow up.  Pt recently fell and has hurt her ankle.  She had a MRI completed.  She states she still has CP, SOB, dizziness and palpitations, but no worse then her LOV.  Pt does take a daily ASA.    Med Refill     Pt request 90 day refills to be sent to [facility].  Medications were verified by the pt.      Lab Work     Pt states their last labs were in January with her PCP.         Cardiac Complaints  chest pressure/discomfort, dyspnea, Dizziness, and palpitations      Subjective   Yulia Abebe is a 68 y.o. 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  She has been tried manage medically with increasing beta-blockers and nitrates but the symptoms persisted.  She was referred to the CT surgery service at .  She underwent surgery with reimplanting the RCA to anterior part of the aorta.  Her postoperative course was complicated with PAF for which she underwent JESSY and cardioversion.  She was discharged home on amiodarone and Eliquis.  Patient went to the hospital at [facility] March 2021 with chest pain and weakness. She underwent EKG at that time which showed no acute ST changes, troponin was negative. Stress and echo were recommended. Stress showed no ischemia with good LV function, no effusion noted.  Patient was advised chest pain appeared to be more GI in nature and was advised to follow with GI for EGD. Most recent EGD showed hernia in April 2021, she is followed by Dr. Mirza. Holter 2023 baseline NSR with HR of 69, no afib, only 17 beat run of SVT. Most recent workup 2024 showed no ischemia, good LV function 70%.     She comes today for follow up and new concerns are denied. SOA, CP, palpitations, and dizziness persist daily. Does not interfere with ADLS, no worse than prior. Labs from Feb 2024 showed: HH 13.7/42.5, TROP <3, Na 141, K  3.3, Creatinine 0.64, BUN 10, GFR 97, Mag 2.0. Refills requested.            Cardiac History  Past Surgical History:   Procedure Laterality Date    APPENDECTOMY      CARDIAC CATHETERIZATION  10/25/2013    Anomalous origin of RCA, normal coronaries, EF65%    CARDIAC CATHETERIZATION  02/18/2020    RCA arising from (L) Coronary Cusp.    CARDIOVASCULAR STRESS TEST  03/14/2012    Stress- 9min, 85% THR./72, negative for ischemia    CARDIOVASCULAR STRESS TEST  10/24/2013    L.Myoview- ([facility]) small apical infarct versus breast attenuation    CARDIOVASCULAR STRESS TEST  11/15/2018    L. Cardiolite- Negative. EF 76%.    CARDIOVASCULAR STRESS TEST  09/17/2019    L. Cardiolite- EF > 70%. Negative.    CARDIOVASCULAR STRESS TEST  03/01/2021    EF 71%, no ischemia    CARDIOVASCULAR STRESS TEST  02/21/2024    Lexiscan- EF > 70%. Negative    CHOLECYSTECTOMY      CONVERTED (HISTORICAL) HOLTER  12/12/2023    5 Days. Avg 69. . One PAT    CORONARY ARTERY BYPASS GRAFT  07/20/2020    Dr. Esteban- Reimplant of RCA    ECHO - CONVERTED  09/18/2010    Echo- ([facility]. Dr. Barr) EF 65%    ECHO - CONVERTED  09/18/2010    D. Echo- ([facility]. Dr. Barr) Negative.    ECHO - CONVERTED  03/14/2012    Echo- EF 65-70%    ECHO - CONVERTED  11/15/2018    EF 55-60%. Mild MR.    ECHO - CONVERTED  09/17/2019    EF 65%. Trace-Mild MR. RVSP- 22 mmHg.    ECHO - CONVERTED  03/01/2021    EF 65-70%, normal diastolic function, trace MR    ECHO - CONVERTED  12/06/2023    TLS. EF 65%. LA- 4.0. Trace MR. RVSP- 26 mmHg    HYSTERECTOMY      OTHER SURGICAL HISTORY  02/20/2020    CTA Heart- Anamalous origina nd Course of RCA. Between Aorta and PA       Current Outpatient Medications   Medication Sig Dispense Refill    aspirin 81 MG EC tablet Take 1 tablet by mouth Daily. 90 tablet 3    atorvastatin (LIPITOR) 20 MG tablet Take 1 tablet by mouth Daily. 90 tablet 2    Carboxymethylcellulose Sodium (REFRESH LIQUIGEL OP) Apply 1 drop to eye(s) as directed by provider  Every Night.      Cholecalciferol (VITAMIN D3) 2000 units tablet Take 1 tablet by mouth Daily.      estradiol (ESTRACE) 0.5 MG tablet Take 0.5 tablets by mouth Daily.      levothyroxine (SYNTHROID, LEVOTHROID) 125 MCG tablet Take 1 tablet by mouth Daily.      linaclotide (LINZESS) 72 MCG capsule capsule Take 1 capsule by mouth Every Morning Before Breakfast.      metoprolol succinate XL (TOPROL-XL) 25 MG 24 hr tablet Take 1 tablet by mouth Every Night. 90 tablet 3    omeprazole (priLOSEC) 40 MG capsule Take 1 capsule by mouth 2 (Two) Times a Day.      predniSONE (DELTASONE) 10 MG tablet Day 1 take SIX tablets. Day 2 take FIVE tablets. Day 3 take FOUR tablets. Day 4 take THREE tablets. Day 5 take TWO tablets. Day 6 take ONE tablet.      sucralfate (CARAFATE) 1 G tablet Take 1 tablet by mouth 4 (Four) Times a Day.       No current facility-administered medications for this visit.       Losartan, Sulfa antibiotics, and Penicillins    Past Medical History:   Diagnosis Date    Abnormal US (ultrasound) of abdomen 11/22/2010    US abdomen. No gallstones. 1.8cm benign cyst right kidney    Abnormal US (ultrasound) of abdomen 11/22/2010    No gallstones. 1.8cm benign cyst right kidney.    Anxiety and depression     Bulging lumbar disc     Celiac disease     Dry eye     H/O: hysterectomy     History of appendectomy     History of basal cell cancer     removed    Hyperlipidemia     Hyperthyroidism     Lump in neck 03/2021    Macular degeneration     Optional surgery     Adhesions removed as a child    Skin cancer        Social History     Socioeconomic History    Marital status:    Tobacco Use    Smoking status: Never    Smokeless tobacco: Never   Vaping Use    Vaping status: Never Used   Substance and Sexual Activity    Alcohol use: No    Drug use: No    Sexual activity: Defer       Family History   Problem Relation Age of Onset    Heart disease Father     Stroke Father     Hypertension Father     Heart disease Other   "   Stroke Other     Colon polyps Brother        Review of Systems   Constitutional: Negative for malaise/fatigue and night sweats.   Cardiovascular:  Positive for chest pain, dyspnea on exertion and palpitations. Negative for claudication, irregular heartbeat, leg swelling, near-syncope and syncope.   Respiratory:  Negative for cough, shortness of breath and wheezing.    Musculoskeletal:  Negative for back pain, joint pain and stiffness.   Gastrointestinal:  Negative for anorexia, heartburn, nausea and vomiting.   Genitourinary:  Negative for dysuria, hematuria, hesitancy and nocturia.   Neurological:  Positive for dizziness and light-headedness.   Psychiatric/Behavioral:  Negative for depression and memory loss. The patient is not nervous/anxious.            Objective     /72 (BP Location: Left arm, Patient Position: Sitting)   Pulse 64   Ht 175.3 cm (69\")   Wt 86 kg (189 lb 9.6 oz)   BMI 28.00 kg/m²     Constitutional:       Appearance: Not in distress.   Eyes:      Pupils: Pupils are equal, round, and reactive to light.   HENT:      Nose: Nose normal.   Pulmonary:      Effort: Pulmonary effort is normal.      Breath sounds: Normal breath sounds.   Cardiovascular:      PMI at left midclavicular line. Normal rate. Regular rhythm.      Murmurs: There is a systolic murmur.   Abdominal:      Palpations: Abdomen is soft.   Musculoskeletal: Normal range of motion.      Cervical back: Normal range of motion and neck supple. Skin:     General: Skin is warm and dry.   Neurological:      Mental Status: Alert.           ECG 12 Lead    Date/Time: 5/16/2024 12:54 PM  Performed by: Martina Witt APRN    Authorized by: Martina Witt APRN  Comparison: compared with previous ECG   Rhythm: sinus rhythm  BPM: 64  Conduction comments: Left atrial enlargement.    Clinical impression: abnormal EKG  Comments: Normal QT               Diagnoses and all orders for this visit:    1. Essential hypertension (Primary)    2. " PAF (paroxysmal atrial fibrillation)  -     ECG 12 Lead    3. Anginal equivalent    4. Hypercholesteremia  -     atorvastatin (LIPITOR) 20 MG tablet; Take 1 tablet by mouth Daily.  Dispense: 90 tablet; Refill: 2    5. Hypokalemia  -     Comprehensive Metabolic Panel; Future    6. Anxiety    7. Crushing injury of left foot, ankle, and toe, sequela    8. Overweight    Other orders  -     metoprolol succinate XL (TOPROL-XL) 25 MG 24 hr tablet; Take 1 tablet by mouth Every Night.  Dispense: 90 tablet; Refill: 3               PAF:  EKG done today in regards shows continued sinus candelario with normal QT. Will continue toprol XL nightly, fatigue improved. Holter from 2023, no afib noted. She has remained on ASA alone since she has remained in rhythm. Palpitations noted, but improved. Holter showed no sig arrhythmia, no PAF. K low in Feb, will recheck.     HTN:  Blood pressure stable. Same metoprolol therapy will be continued. Limited sodium advised.     Cardiac status: Workup done 2023 and 2024 neg for ischemia, good LV function, no valve concerns noted. Continue ASA     Hyperlipidemia:  On statin therapy with lipitor, tolerance reported.  Same continued. FLP followed by your office, can we have for review? Limited carb diet urged.    Injured foot: Has had MRI, not get results back yet.      BMI noted at 28.0, good cardiac diet with limited carbs, calories, and activity as tolerated advised.     Refills per request.     6 month follow up recommended or sooner if needed.           Problems Addressed this Visit          Cardiac and Vasculature    Essential hypertension - Primary    Relevant Medications    metoprolol succinate XL (TOPROL-XL) 25 MG 24 hr tablet    Hypercholesteremia    Relevant Medications    atorvastatin (LIPITOR) 20 MG tablet    PAF (paroxysmal atrial fibrillation)    Relevant Medications    metoprolol succinate XL (TOPROL-XL) 25 MG 24 hr tablet    Other Relevant Orders    ECG 12 Lead       Mental Health     Anxiety     Other Visit Diagnoses       Anginal equivalent        Relevant Medications    metoprolol succinate XL (TOPROL-XL) 25 MG 24 hr tablet    Hypokalemia        Relevant Orders    Comprehensive Metabolic Panel    Crushing injury of left foot, ankle, and toe, sequela        Overweight              Diagnoses         Codes Comments    Essential hypertension    -  Primary ICD-10-CM: I10  ICD-9-CM: 401.9     PAF (paroxysmal atrial fibrillation)     ICD-10-CM: I48.0  ICD-9-CM: 427.31     Anginal equivalent     ICD-10-CM: I20.89  ICD-9-CM: 413.9     Hypercholesteremia     ICD-10-CM: E78.00  ICD-9-CM: 272.0     Hypokalemia     ICD-10-CM: E87.6  ICD-9-CM: 276.8     Anxiety     ICD-10-CM: F41.9  ICD-9-CM: 300.00     Crushing injury of left foot, ankle, and toe, sequela     ICD-10-CM: S97.82XS, S97.102S, S97.02XS  ICD-9-CM: 906.4     Overweight     ICD-10-CM: E66.3  ICD-9-CM: 278.02                            Electronically signed by Martina Witt, APRN May 16, 2024 14:25 EDT

## 2024-05-16 NOTE — ACP (ADVANCE CARE PLANNING)
Advance Care Planning   ACP discussion was held with the patient during this visit. Patient does not have an advance directive, declines further assistance.        [facility] pt (Dr. Wolff, [facility])     IVF in [place] tomorrow 1/6 (as scheduled)  Continue weekly IVF in [place] [facility]    Standing weekly labs x 4 in [place] when in for IVF (CBC, CMP, lytes)  EKG every 2 weeks x 2 in  [place] when in for IVF    2 weeks - visit with primary Maynor [facility] (virtual)    PT Referral, [facility]

## 2024-05-22 RX ORDER — SODIUM, POTASSIUM,MAG SULFATES 17.5-3.13G
SOLUTION, RECONSTITUTED, ORAL ORAL
Qty: 354 ML | Refills: 0 | Status: SHIPPED | OUTPATIENT
Start: 2024-05-22

## 2024-06-06 RX ORDER — SODIUM, POTASSIUM,MAG SULFATES 17.5-3.13G
SOLUTION, RECONSTITUTED, ORAL ORAL
Qty: 354 ML | Refills: 0 | Status: SHIPPED | OUTPATIENT
Start: 2024-06-06

## 2024-06-20 ENCOUNTER — OUTSIDE FACILITY SERVICE (OUTPATIENT)
Dept: GASTROENTEROLOGY | Facility: CLINIC | Age: 69
End: 2024-06-20
Payer: MEDICARE

## 2024-06-20 PROCEDURE — G0104 CA SCREEN;FLEXI SIGMOIDSCOPE: HCPCS | Performed by: INTERNAL MEDICINE

## 2024-06-21 ENCOUNTER — TELEPHONE (OUTPATIENT)
Dept: GASTROENTEROLOGY | Facility: CLINIC | Age: 69
End: 2024-06-21
Payer: MEDICARE

## 2024-06-21 DIAGNOSIS — Z80.0 FAMILY HISTORY OF GI MALIGNANCY: Primary | ICD-10-CM

## 2024-06-21 NOTE — TELEPHONE ENCOUNTER
Hub staff attempted to follow warm transfer process and was unsuccessful     Caller: Yulia Abebe    Relationship to patient: Self    Best call back number:  [phone]    Patient is needing: PT HAD COLONOSCOPY ON 06/20/24 AND WAS INFORMED THAT SHE MAY NEED FURTHER TESTING WITH IN TWO WEEKS, PLEASE CALL AND ADVISE.

## 2024-06-25 NOTE — TELEPHONE ENCOUNTER
Order entered for ACBE.  Patient will need to call for a bowel prep after she is scheduled for that procedure.

## 2024-07-05 ENCOUNTER — HOSPITAL ENCOUNTER (OUTPATIENT)
Dept: GENERAL RADIOLOGY | Facility: HOSPITAL | Age: 69
Discharge: HOME | End: 2024-07-05
Payer: MEDICARE

## 2024-07-05 DIAGNOSIS — Z80.0 FAMILY HISTORY OF GI MALIGNANCY: ICD-10-CM

## 2024-07-05 PROCEDURE — 74280 X-RAY XM COLON 2CNTRST STD: CPT

## 2024-07-05 RX ADMIN — BARIUM SULFATE 1900 ML: 1.05 SUSPENSION ORAL; RECTAL at 10:30

## 2024-11-26 ENCOUNTER — OFFICE VISIT (OUTPATIENT)
Dept: CARDIOLOGY | Facility: CLINIC | Age: 69
End: 2024-11-26
Payer: MEDICARE

## 2024-11-26 VITALS
DIASTOLIC BLOOD PRESSURE: 75 MMHG | WEIGHT: 190 LBS | HEIGHT: 69 IN | SYSTOLIC BLOOD PRESSURE: 130 MMHG | BODY MASS INDEX: 28.14 KG/M2 | RESPIRATION RATE: 18 BRPM | HEART RATE: 66 BPM

## 2024-11-26 DIAGNOSIS — E66.3 OVERWEIGHT: ICD-10-CM

## 2024-11-26 DIAGNOSIS — R00.2 PALPITATIONS: ICD-10-CM

## 2024-11-26 DIAGNOSIS — E78.2 MIXED HYPERLIPIDEMIA: ICD-10-CM

## 2024-11-26 DIAGNOSIS — I10 ESSENTIAL HYPERTENSION: ICD-10-CM

## 2024-11-26 DIAGNOSIS — I48.0 PAF (PAROXYSMAL ATRIAL FIBRILLATION): Primary | ICD-10-CM

## 2024-11-26 NOTE — PROGRESS NOTES
Chief Complaint   Patient presents with    Follow-up     Pt is here for cardiac follow up.   Pt denies any hospital admissions or ER visits since last appointment. No recent cardiac work up any where but here. Labs in chart from 2/12/24, Labs done recently 3 weeks ago per pcp     Palpitations     Pt states that she has occasional palpitation. Last episode being last week     Hypertension     Bp at home has been good     Med Refill     90 day refills on cardiac meds to [facility], medications brought to appointment,        Cardiac Complaints  palpitations      Subjective   Yulia Abebe is a 69 y.o. 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  She has been tried manage medically with increasing beta-blockers and nitrates but the symptoms persisted.  She was referred to the CT surgery service at .  She underwent surgery with reimplanting the RCA to anterior part of the aorta.  Her postoperative course was complicated with PAF for which she underwent JESSY and cardioversion.  She was discharged home on amiodarone and Eliquis.  Patient went to the hospital at [facility] March 2021 with chest pain and weakness. She underwent EKG at that time which showed no acute ST changes, troponin was negative. Stress and echo were recommended. Stress showed no ischemia with good LV function, no effusion noted.  Patient was advised chest pain appeared to be more GI in nature and was advised to follow with GI for EGD. Most recent EGD showed hernia in April 2021, she is followed by Dr. Mirza. Holter 2023 baseline NSR with HR of 69, no afib, only 17 beat run of SVT. Most recent workup 2024 showed no ischemia, good LV function 70%.     She comes today for follow up and new concerns denied. She does admit to occasional palpitations, but this is not frequent. Labs with PCP, checked 3 weeks ago, none available for review. Bleed and bruise  denied, she is taking ASA alone, as she has remained in NSR.  Refills requested.             Cardiac History  Past Surgical History:   Procedure Laterality Date    APPENDECTOMY      CARDIAC CATHETERIZATION  10/25/2013    Anomalous origin of RCA, normal coronaries, EF65%    CARDIAC CATHETERIZATION  02/18/2020    RCA arising from (L) Coronary Cusp.    CARDIOVASCULAR STRESS TEST  03/14/2012    Stress- 9min, 85% THR./72, negative for ischemia    CARDIOVASCULAR STRESS TEST  10/24/2013    L.Myoview- ([facility]) small apical infarct versus breast attenuation    CARDIOVASCULAR STRESS TEST  11/15/2018    L. Cardiolite- Negative. EF 76%.    CARDIOVASCULAR STRESS TEST  09/17/2019    L. Cardiolite- EF > 70%. Negative.    CARDIOVASCULAR STRESS TEST  03/01/2021    EF 71%, no ischemia    CARDIOVASCULAR STRESS TEST  02/21/2024    Lexiscan- EF > 70%. Negative    CHOLECYSTECTOMY      CONVERTED (HISTORICAL) HOLTER  12/12/2023    5 Days. Avg 69. . One PAT    CORONARY ARTERY BYPASS GRAFT  07/20/2020    Dr. Esteban- Reimplant of RCA    ECHO - CONVERTED  09/18/2010    Echo- ([facility]. Dr. Barr) EF 65%    ECHO - CONVERTED  09/18/2010    D. Echo- ([facility]. Dr. Barr) Negative.    ECHO - CONVERTED  03/14/2012    Echo- EF 65-70%    ECHO - CONVERTED  11/15/2018    EF 55-60%. Mild MR.    ECHO - CONVERTED  09/17/2019    EF 65%. Trace-Mild MR. RVSP- 22 mmHg.    ECHO - CONVERTED  03/01/2021    EF 65-70%, normal diastolic function, trace MR    ECHO - CONVERTED  12/06/2023    TLS. EF 65%. LA- 4.0. Trace MR. RVSP- 26 mmHg    HYSTERECTOMY      OTHER SURGICAL HISTORY  02/20/2020    CTA Heart- Anamalous origina nd Course of RCA. Between Aorta and PA       Current Outpatient Medications   Medication Sig Dispense Refill    aspirin 81 MG EC tablet Take 1 tablet by mouth Daily. 90 tablet 3    atorvastatin (LIPITOR) 20 MG tablet Take 1 tablet by mouth Daily. 90 tablet 2    Carboxymethylcellulose Sodium (REFRESH LIQUIGEL OP) Apply 1 drop to eye(s) as directed  by provider Every Night.      Cholecalciferol (VITAMIN D3) 2000 units tablet Take 1 tablet by mouth Daily.      estradiol (ESTRACE) 0.5 MG tablet Take 0.5 tablets by mouth Daily.      levothyroxine (SYNTHROID, LEVOTHROID) 125 MCG tablet Take 1 tablet by mouth Daily.      linaclotide (LINZESS) 72 MCG capsule capsule Take 1 capsule by mouth Every Morning Before Breakfast.      metoprolol succinate XL (TOPROL-XL) 25 MG 24 hr tablet Take 1 tablet by mouth Every Night. 90 tablet 3    omeprazole (priLOSEC) 40 MG capsule Take 1 capsule by mouth 2 (Two) Times a Day.      sucralfate (CARAFATE) 1 G tablet Take 1 tablet by mouth 4 (Four) Times a Day.       No current facility-administered medications for this visit.       Losartan, Sulfa antibiotics, and Penicillins    Past Medical History:   Diagnosis Date    Abnormal US (ultrasound) of abdomen 11/22/2010    US abdomen. No gallstones. 1.8cm benign cyst right kidney    Abnormal US (ultrasound) of abdomen 11/22/2010    No gallstones. 1.8cm benign cyst right kidney.    Anxiety and depression     Bulging lumbar disc     Celiac disease     Dry eye     H/O: hysterectomy     History of appendectomy     History of basal cell cancer     removed    Hyperlipidemia     Hyperthyroidism     Lump in neck 03/2021    Macular degeneration     Optional surgery     Adhesions removed as a child    Skin cancer        Social History     Socioeconomic History    Marital status:    Tobacco Use    Smoking status: Never    Smokeless tobacco: Never   Vaping Use    Vaping status: Never Used   Substance and Sexual Activity    Alcohol use: No    Drug use: No    Sexual activity: Defer       Family History   Problem Relation Age of Onset    Heart disease Father     Stroke Father     Hypertension Father     Heart disease Other     Stroke Other     Colon polyps Brother        Review of Systems   Constitutional: Negative for malaise/fatigue and night sweats.   Cardiovascular:  Positive for palpitations.  "Negative for chest pain, claudication, dyspnea on exertion, irregular heartbeat, leg swelling, near-syncope, orthopnea and syncope.   Respiratory:  Negative for cough, shortness of breath and wheezing.    Musculoskeletal:  Positive for stiffness. Negative for back pain and joint pain.   Gastrointestinal:  Negative for anorexia, heartburn, nausea and vomiting.   Genitourinary:  Negative for dysuria, hematuria, hesitancy and nocturia.   Neurological:  Negative for dizziness, light-headedness and loss of balance.   Psychiatric/Behavioral:  Negative for depression and memory loss. The patient is nervous/anxious.            Objective     /75 (BP Location: Left arm, Patient Position: Sitting, Cuff Size: Adult)   Pulse 66   Resp 18   Ht 175.3 cm (69\")   Wt 86.2 kg (190 lb)   BMI 28.06 kg/m²     Constitutional:       Appearance: Not in distress.   Eyes:      Pupils: Pupils are equal, round, and reactive to light.   HENT:      Nose: Nose normal.   Pulmonary:      Effort: Pulmonary effort is normal.      Breath sounds: Normal breath sounds.   Cardiovascular:      PMI at left midclavicular line. Normal rate. Regular rhythm.      Murmurs: There is a systolic murmur.   Abdominal:      Palpations: Abdomen is soft.   Musculoskeletal: Normal range of motion.      Cervical back: Normal range of motion and neck supple. Skin:     General: Skin is warm and dry.   Neurological:      Mental Status: Alert.           ECG 12 Lead    Date/Time: 11/26/2024 1:01 PM  Performed by: Martina Witt APRN    Authorized by: Martina Witt APRN  Comparison: compared with previous ECG from 5/16/2024  Similar to previous ECG  Rhythm: sinus rhythm  BPM: 66    Clinical impression: non-specific ECG  Comments: Normal QT               Diagnoses and all orders for this visit:    1. PAF (paroxysmal atrial fibrillation) (Primary)  -     ECG 12 Lead    2. Essential hypertension    3. Mixed hyperlipidemia    4. Palpitations    5. " Overweight             PAF:  EKG done today in regards shows continued sinus candelario with normal QT. Will continue toprol XL nightly, fatigue improved. Holter from 2023, showed no afib. She has remained on ASA alone, since she has remained in rhythm. Palpitations noted, but only rarely.     HTN:  Blood pressure stable. Same metoprolol therapy will be continued. Limited sodium advised.     Cardiac status: Workup done 2023 and 2024 neg for ischemia, good LV function, no valve concerns noted. Continue ASA, no new workup.     Hyperlipidemia:  On statin therapy with lipitor, tolerance reported.  Same continued. FLP followed by your office, can we have for review,checked 3 months ago? Limited carb diet urged.    BMI noted at 28.06, good cardiac diet with limited carbs, calories, and activity as tolerated advised.     Refills per request.     6 month follow up recommended or sooner if needed.        Problems Addressed this Visit          Cardiac and Vasculature    Essential hypertension    Mixed hyperlipidemia    Palpitations    PAF (paroxysmal atrial fibrillation) - Primary    Relevant Orders    ECG 12 Lead (Completed)     Other Visit Diagnoses       Overweight              Diagnoses         Codes Comments    PAF (paroxysmal atrial fibrillation)    -  Primary ICD-10-CM: I48.0  ICD-9-CM: 427.31     Essential hypertension     ICD-10-CM: I10  ICD-9-CM: 401.9     Mixed hyperlipidemia     ICD-10-CM: E78.2  ICD-9-CM: 272.2     Palpitations     ICD-10-CM: R00.2  ICD-9-CM: 785.1     Overweight     ICD-10-CM: E66.3  ICD-9-CM: 278.02                     Electronically signed by SRIKANTH Plata November 26, 2024 16:38 EST

## 2025-03-06 DIAGNOSIS — E78.00 HYPERCHOLESTEREMIA: ICD-10-CM

## 2025-03-11 ENCOUNTER — TELEPHONE (OUTPATIENT)
Dept: GASTROENTEROLOGY | Facility: CLINIC | Age: 70
End: 2025-03-11
Payer: MEDICARE

## 2025-03-11 RX ORDER — ATORVASTATIN CALCIUM 20 MG/1
20 TABLET, FILM COATED ORAL DAILY
Qty: 90 TABLET | Refills: 3 | Status: SHIPPED | OUTPATIENT
Start: 2025-03-11

## 2025-03-11 NOTE — TELEPHONE ENCOUNTER
Caller: Yulia Abebe    Relationship to patient: Self    Best call back number: [phone] (Mobile)     Patient is needing: PT WAS SUPPOSED TO HAVE HER YEARLY EXAM WITH [facility] APRIL 1 BUT NOTHING WAS SCHEDULED, INFORMED PT HIS NEXT AVAIL WAS AUGUST, SHE'S UPSET AND WANTS TO KNOW WHY SHE WASN'T SCHEDULED FOR YEARLY IN [facility], PLEASE ADVISE

## 2025-04-08 ENCOUNTER — OFFICE VISIT (OUTPATIENT)
Dept: GASTROENTEROLOGY | Facility: CLINIC | Age: 70
End: 2025-04-08
Payer: MEDICARE

## 2025-04-08 VITALS
WEIGHT: 195.2 LBS | HEART RATE: 67 BPM | SYSTOLIC BLOOD PRESSURE: 127 MMHG | BODY MASS INDEX: 28.91 KG/M2 | DIASTOLIC BLOOD PRESSURE: 91 MMHG | TEMPERATURE: 97.3 F | HEIGHT: 69 IN

## 2025-04-08 DIAGNOSIS — K21.9 GASTROESOPHAGEAL REFLUX DISEASE WITHOUT ESOPHAGITIS: ICD-10-CM

## 2025-04-08 DIAGNOSIS — Z80.0 FAMILY HISTORY OF GI MALIGNANCY: Primary | ICD-10-CM

## 2025-04-08 DIAGNOSIS — K90.0 CELIAC DISEASE: ICD-10-CM

## 2025-04-08 DIAGNOSIS — K59.04 CHRONIC IDIOPATHIC CONSTIPATION: ICD-10-CM

## 2025-04-08 RX ORDER — LANCETS 33 GAUGE
EACH MISCELLANEOUS
COMMUNITY
Start: 2025-01-15

## 2025-04-08 RX ORDER — ONDANSETRON 4 MG/1
TABLET, ORALLY DISINTEGRATING ORAL
COMMUNITY
Start: 2025-04-01

## 2025-04-08 RX ORDER — BLOOD SUGAR DIAGNOSTIC
STRIP MISCELLANEOUS
COMMUNITY
Start: 2025-01-15

## 2025-04-08 NOTE — PROGRESS NOTES
PCP:  Carissa Montejo APRN     No referring provider defined for this encounter.    Chief Complaint   Patient presents with    1 year follow up        HPI   Patient comes in.  She had some chest pains after some mammograms.  She was evaluated by her family physician was found to have chest wall pain.  She had significant pain to palpation over her rib cage area anteriorly.  She continues on her Linzess 72 mcg a day.  She takes her omeprazole to 40 mg twice a day for her reflux symptoms.  She did have history of celiac disease last upper endoscopy was 4/2/2021.  Biopsies at that time showed no significant abnormalities in the duodenum which suggests good adherence to the diet.  She does notice when she gets gluten inadvertently that she has significant issues.  Interestingly her antibodies were not that high.    Allergies   Allergen Reactions    Losartan Nausea And Vomiting    Sulfa Antibiotics Swelling and Unknown - Low Severity    Penicillins Rash and Unknown - Low Severity          Current Outpatient Medications:     aspirin 81 MG EC tablet, Take 1 tablet by mouth Daily., Disp: 90 tablet, Rfl: 3    atorvastatin (LIPITOR) 20 MG tablet, TAKE 1 TABLET BY MOUTH ONCE DAILY, Disp: 90 tablet, Rfl: 3    Carboxymethylcellulose Sodium (REFRESH LIQUIGEL OP), Apply 1 drop to eye(s) as directed by provider Every Night., Disp: , Rfl:     Cholecalciferol (VITAMIN D3) 2000 units tablet, Take 1 tablet by mouth Daily., Disp: , Rfl:     estradiol (ESTRACE) 0.5 MG tablet, Take 0.5 tablets by mouth Daily., Disp: , Rfl:     Lancets (OneTouch Delica Plus [id number]) misc, USE TO TEST BLOOD SUGAR ONCE DAILY WHILE FASTING, Disp: , Rfl:     levothyroxine (SYNTHROID, LEVOTHROID) 125 MCG tablet, Take 1 tablet by mouth Daily., Disp: , Rfl:     metoprolol succinate XL (TOPROL-XL) 25 MG 24 hr tablet, Take 1 tablet by mouth Every Night., Disp: 90 tablet, Rfl: 3    omeprazole (priLOSEC) 40 MG capsule, Take 1 capsule by mouth 2 (Two)  Times a Day., Disp: , Rfl:     ondansetron ODT (ZOFRAN-ODT) 4 MG disintegrating tablet, DISSOLVE 1 TABLET ON THE TONGUE EVERY 8 HOURS FOR FOR NAUSEA AND VOMITING, Disp: , Rfl:     OneTouch Verio test strip, USE TO TEST BLOOD SUGAR ONCE DAILY WHILE FASTING, Disp: , Rfl:     sucralfate (CARAFATE) 1 G tablet, Take 1 tablet by mouth 4 (Four) Times a Day., Disp: , Rfl:     vitamin E 200 UNIT capsule, Take 1 capsule by mouth Daily., Disp: , Rfl:     linaclotide (LINZESS) 72 MCG capsule capsule, Take 1 capsule by mouth Every Morning Before Breakfast., Disp: 30 capsule, Rfl: 11     Past Medical History:   Diagnosis Date    Abnormal US (ultrasound) of abdomen 11/22/2010    US abdomen. No gallstones. 1.8cm benign cyst right kidney    Abnormal US (ultrasound) of abdomen 11/22/2010    No gallstones. 1.8cm benign cyst right kidney.    Anxiety and depression     Bulging lumbar disc     Celiac disease     Dry eye     H/O: hysterectomy     History of appendectomy     History of basal cell cancer     removed    Hyperlipidemia     Hyperthyroidism     Lump in neck 03/2021    Macular degeneration     Optional surgery     Adhesions removed as a child    Skin cancer        Past Surgical History:   Procedure Laterality Date    APPENDECTOMY      CARDIAC CATHETERIZATION  10/25/2013    Anomalous origin of RCA, normal coronaries, EF65%    CARDIAC CATHETERIZATION  02/18/2020    RCA arising from (L) Coronary Cusp.    CARDIOVASCULAR STRESS TEST  03/14/2012    Stress- 9min, 85% THR./72, negative for ischemia    CARDIOVASCULAR STRESS TEST  10/24/2013    L.Myoview- ([facility]) small apical infarct versus breast attenuation    CARDIOVASCULAR STRESS TEST  11/15/2018    L. Cardiolite- Negative. EF 76%.    CARDIOVASCULAR STRESS TEST  09/17/2019    L. Cardiolite- EF > 70%. Negative.    CARDIOVASCULAR STRESS TEST  03/01/2021    EF 71%, no ischemia    CARDIOVASCULAR STRESS TEST  02/21/2024    Lexiscan- EF > 70%. Negative    CHOLECYSTECTOMY      CONVERTED  (HISTORICAL) HOLTER  12/12/2023    5 Days. Avg 69. . One PAT    CORONARY ARTERY BYPASS GRAFT  07/20/2020    Dr. Esteban- Reimplant of RCA    ECHO - CONVERTED  09/18/2010    Echo- ([facility]. Dr. Barr) EF 65%    ECHO - CONVERTED  09/18/2010    D. Echo- ([facility]. Dr. Barr) Negative.    ECHO - CONVERTED  03/14/2012    Echo- EF 65-70%    ECHO - CONVERTED  11/15/2018    EF 55-60%. Mild MR.    ECHO - CONVERTED  09/17/2019    EF 65%. Trace-Mild MR. RVSP- 22 mmHg.    ECHO - CONVERTED  03/01/2021    EF 65-70%, normal diastolic function, trace MR    ECHO - CONVERTED  12/06/2023    TLS. EF 65%. LA- 4.0. Trace MR. RVSP- 26 mmHg    HYSTERECTOMY      OTHER SURGICAL HISTORY  02/20/2020    CTA Heart- Anamalous origina nd Course of RCA. Between Aorta and PA        Social History     Socioeconomic History    Marital status:    Tobacco Use    Smoking status: Never    Smokeless tobacco: Never   Vaping Use    Vaping status: Never Used   Substance and Sexual Activity    Alcohol use: No    Drug use: No    Sexual activity: Defer        Family History   Problem Relation Age of Onset    Heart disease Father     Stroke Father     Hypertension Father     Heart disease Other     Stroke Other     Colon polyps Brother         Review of Systems     Vitals:    04/08/25 1508   BP: 127/91   Pulse: 67   Temp: 97.3 °F (36.3 °C)        Physical Exam     Diagnoses and all orders for this visit:    1. Family history of GI malignancy (Primary): She had a partial colonoscopy and a barium enema showing diverticulosis.  This was done 7/5/2024.    2. Chronic idiopathic constipation: We will call in the Linzess 72 mcg daily.  I did give her some samples as well.    3. Gastroesophageal reflux disease without esophagitis: She has no new issues from the standpoint.    4. Celiac disease: She seems to be doing well from that standpoint and is adhering to a gluten-free diet.    Other orders  -     linaclotide (LINZESS) 72 MCG capsule capsule; Take 1 capsule by  mouth Every Morning Before Breakfast.  Dispense: 30 capsule; Refill: 11         Ernesto Mirza MD

## 2025-06-10 ENCOUNTER — OFFICE VISIT (OUTPATIENT)
Dept: CARDIOLOGY | Facility: CLINIC | Age: 70
End: 2025-06-10
Payer: MEDICARE

## 2025-06-10 VITALS
HEIGHT: 69 IN | WEIGHT: 190 LBS | BODY MASS INDEX: 28.14 KG/M2 | DIASTOLIC BLOOD PRESSURE: 70 MMHG | HEART RATE: 70 BPM | SYSTOLIC BLOOD PRESSURE: 112 MMHG

## 2025-06-10 DIAGNOSIS — I48.0 PAF (PAROXYSMAL ATRIAL FIBRILLATION): Primary | ICD-10-CM

## 2025-06-10 DIAGNOSIS — E11.9 TYPE 2 DIABETES MELLITUS WITHOUT COMPLICATION, WITHOUT LONG-TERM CURRENT USE OF INSULIN: ICD-10-CM

## 2025-06-10 DIAGNOSIS — E78.2 MIXED HYPERLIPIDEMIA: ICD-10-CM

## 2025-06-10 DIAGNOSIS — I10 ESSENTIAL HYPERTENSION: ICD-10-CM

## 2025-06-10 DIAGNOSIS — E66.3 OVERWEIGHT: ICD-10-CM

## 2025-06-10 DIAGNOSIS — E78.00 HYPERCHOLESTEREMIA: ICD-10-CM

## 2025-06-10 PROCEDURE — 99214 OFFICE O/P EST MOD 30 MIN: CPT | Performed by: NURSE PRACTITIONER

## 2025-06-10 PROCEDURE — 3078F DIAST BP <80 MM HG: CPT | Performed by: NURSE PRACTITIONER

## 2025-06-10 PROCEDURE — 3074F SYST BP LT 130 MM HG: CPT | Performed by: NURSE PRACTITIONER

## 2025-06-10 PROCEDURE — 93000 ELECTROCARDIOGRAM COMPLETE: CPT | Performed by: NURSE PRACTITIONER

## 2025-06-10 RX ORDER — TRIAMCINOLONE ACETONIDE 1 MG/G
1 OINTMENT TOPICAL 2 TIMES DAILY
COMMUNITY

## 2025-06-10 RX ORDER — METOPROLOL SUCCINATE 25 MG/1
25 TABLET, EXTENDED RELEASE ORAL NIGHTLY
Qty: 90 TABLET | Refills: 3 | Status: SHIPPED | OUTPATIENT
Start: 2025-06-10

## 2025-06-10 RX ORDER — ATORVASTATIN CALCIUM 20 MG/1
20 TABLET, FILM COATED ORAL DAILY
Qty: 90 TABLET | Refills: 3 | Status: SHIPPED | OUTPATIENT
Start: 2025-06-10

## 2025-06-10 NOTE — PROGRESS NOTES
Chief Complaint   Patient presents with    Follow-up     For cardiac management. Patient is on aspirin. Last lab work was done in May 2025 per PCP, not in chart. States that A1C had gone down to 7. We do have labs from February 2025 in chart under media. States that she did have some chest pain about 2 months ago, got some sharp pains, went to PCP and they did an US of chest, she has a nodule in her chest, will attempt to get report.     Med Refill     Needs refills on cardiac medications. 90 day supplies to [facility]. Went over medications verbally.        Cardiac Complaints  none      Subjective   Yulia Abebe is a 69 y.o. 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  She has been tried manage medically with increasing beta-blockers and nitrates but the symptoms persisted.  She was referred to the CT surgery service at .  She underwent surgery with reimplanting the RCA to anterior part of the aorta.  Her postoperative course was complicated with PAF for which she underwent JESSY and cardioversion.  She was discharged home on amiodarone and Eliquis.  Patient went to the hospital at [facility] March 2021 with chest pain and weakness. She underwent EKG at that time which showed no acute ST changes, troponin was negative. Stress and echo were recommended. Stress showed no ischemia with good LV function, no effusion noted.  Patient was advised chest pain appeared to be more GI in nature and was advised to follow with GI for EGD. Most recent EGD showed hernia in April 2021, she is followed by Dr. Mirza. Holter 2023 baseline NSR with HR of 69, no afib, only 17 beat run of SVT. Most recent workup 2024 showed no ischemia, good LV function 70%.       She comes today for follow up admits to sharp chest pain that occurred about 2 months ago, but has since improved. She does admit to an US of her chest? Spot noted.  Labs 2/2025 showed: AIC 7.3%, Na 143, K 5, Creatinine 0.74, BUN 9, GFR 88, TSH 0.854, HH 14/44, TRIG 123, HDL 73, LDL 79. Patient reports AIC improved to 7% on most recent labs. Refills requested.          Cardiac History  Past Surgical History:   Procedure Laterality Date    APPENDECTOMY      CARDIAC CATHETERIZATION  10/25/2013    Anomalous origin of RCA, normal coronaries, EF65%    CARDIAC CATHETERIZATION  02/18/2020    RCA arising from (L) Coronary Cusp.    CARDIOVASCULAR STRESS TEST  03/14/2012    Stress- 9min, 85% THR./72, negative for ischemia    CARDIOVASCULAR STRESS TEST  10/24/2013    L.Myoview- ([facility]) small apical infarct versus breast attenuation    CARDIOVASCULAR STRESS TEST  11/15/2018    L. Cardiolite- Negative. EF 76%.    CARDIOVASCULAR STRESS TEST  09/17/2019    L. Cardiolite- EF > 70%. Negative.    CARDIOVASCULAR STRESS TEST  03/01/2021    EF 71%, no ischemia    CARDIOVASCULAR STRESS TEST  02/21/2024    Lexiscan- EF > 70%. Negative    CHOLECYSTECTOMY      CONVERTED (HISTORICAL) HOLTER  12/12/2023    5 Days. Avg 69. . One PAT    CORONARY ARTERY BYPASS GRAFT  07/20/2020    Dr. Esteban- Reimplant of RCA    ECHO - CONVERTED  09/18/2010    Echo- ([facility]. Dr. Barr) EF 65%    ECHO - CONVERTED  09/18/2010    D. Echo- ([facility]. Dr. Barr) Negative.    ECHO - CONVERTED  03/14/2012    Echo- EF 65-70%    ECHO - CONVERTED  11/15/2018    EF 55-60%. Mild MR.    ECHO - CONVERTED  09/17/2019    EF 65%. Trace-Mild MR. RVSP- 22 mmHg.    ECHO - CONVERTED  03/01/2021    EF 65-70%, normal diastolic function, trace MR    ECHO - CONVERTED  12/06/2023    TLS. EF 65%. LA- 4.0. Trace MR. RVSP- 26 mmHg    HYSTERECTOMY      OTHER SURGICAL HISTORY  02/20/2020    CTA Heart- Anamalous origina nd Course of RCA. Between Aorta and PA       Current Outpatient Medications   Medication Sig Dispense Refill    aspirin 81 MG EC tablet Take 1 tablet by mouth Daily. 90 tablet 3    atorvastatin (LIPITOR) 20 MG tablet Take 1 tablet by  mouth Daily. 90 tablet 3    Carboxymethylcellulose Sodium (REFRESH LIQUIGEL OP) Apply 1 drop to eye(s) as directed by provider Every Night.      Cholecalciferol (VITAMIN D3) 2000 units tablet Take 1 tablet by mouth Daily.      estradiol (ESTRACE) 0.5 MG tablet Take 0.5 tablets by mouth Daily.      EVENING PRIMROSE OIL PO Take 1 tablet by mouth Daily.      Lancets (OneTouch Delica Plus [id number]) misc USE TO TEST BLOOD SUGAR ONCE DAILY WHILE FASTING      levothyroxine (SYNTHROID, LEVOTHROID) 125 MCG tablet Take 1 tablet by mouth Daily.      linaclotide (Linzess) 72 MCG capsule capsule Take 1 capsule by mouth Daily As Needed.      metoprolol succinate XL (TOPROL-XL) 25 MG 24 hr tablet Take 1 tablet by mouth Every Night. 90 tablet 3    metroNIDAZOLE (METROCREAM) 0.75 % cream Apply 1 Application topically to the appropriate area as directed 2 (Two) Times a Day.      omeprazole (priLOSEC) 40 MG capsule Take 1 capsule by mouth 2 (Two) Times a Day.      OneTouch Verio test strip USE TO TEST BLOOD SUGAR ONCE DAILY WHILE FASTING      sucralfate (CARAFATE) 1 G tablet Take 1 tablet by mouth 4 (Four) Times a Day.      triamcinolone (KENALOG) 0.1 % ointment Apply 1 Application topically to the appropriate area as directed 2 (Two) Times a Day.      vitamin E 200 UNIT capsule Take 1 capsule by mouth 2 (Two) Times a Day.       No current facility-administered medications for this visit.       Losartan, Sulfa antibiotics, and Penicillins    Past Medical History:   Diagnosis Date    Abnormal US (ultrasound) of abdomen 11/22/2010    US abdomen. No gallstones. 1.8cm benign cyst right kidney    Abnormal US (ultrasound) of abdomen 11/22/2010    No gallstones. 1.8cm benign cyst right kidney.    Anxiety and depression     Bulging lumbar disc     Celiac disease     Dry eye     H/O: hysterectomy     History of appendectomy     History of basal cell cancer     removed    Hyperlipidemia     Hyperthyroidism     Lump in neck 03/2021    Macular  "degeneration     Optional surgery     Adhesions removed as a child    Skin cancer        Social History     Socioeconomic History    Marital status:    Tobacco Use    Smoking status: Never    Smokeless tobacco: Never   Vaping Use    Vaping status: Never Used   Substance and Sexual Activity    Alcohol use: No    Drug use: No    Sexual activity: Defer       Family History   Problem Relation Age of Onset    Heart disease Father     Stroke Father     Hypertension Father     Heart disease Other     Stroke Other     Colon polyps Brother        Review of Systems   Constitutional: Negative for malaise/fatigue and night sweats.   Cardiovascular:  Negative for chest pain, claudication, dyspnea on exertion, irregular heartbeat, leg swelling, near-syncope, orthopnea, palpitations and syncope.   Respiratory:  Negative for cough, shortness of breath and wheezing.    Musculoskeletal:  Positive for stiffness. Negative for back pain and joint pain.   Gastrointestinal:  Negative for anorexia, heartburn, nausea and vomiting.   Genitourinary:  Negative for dysuria, hematuria, hesitancy and nocturia.   Neurological:  Negative for dizziness, light-headedness and loss of balance.   Psychiatric/Behavioral:  Negative for depression and memory loss. The patient is not nervous/anxious.            Objective     /70 (BP Location: Right arm)   Pulse 70   Ht 175.3 cm (69.02\")   Wt 86.2 kg (190 lb)   BMI 28.05 kg/m²     Constitutional:       Appearance: Not in distress.   Eyes:      Pupils: Pupils are equal, round, and reactive to light.   HENT:      Nose: Nose normal.   Pulmonary:      Effort: Pulmonary effort is normal.      Breath sounds: Normal breath sounds.   Cardiovascular:      PMI at left midclavicular line. Normal rate. Regular rhythm.      Murmurs: There is a systolic murmur.   Musculoskeletal: Normal range of motion.      Cervical back: Normal range of motion and neck supple. Skin:     General: Skin is warm and dry. "   Neurological:      Mental Status: Alert.           ECG 12 Lead    Date/Time: 6/10/2025 12:29 PM  Performed by: Martina Witt APRN    Authorized by: Martina Witt APRN  Comparison: compared with previous ECG from 11/26/2024  Similar to previous ECG  Comparison to previous ECG: Rate improved  Rhythm: sinus rhythm  BPM: 70    Clinical impression: non-specific ECG  Comments: Normal QT               Diagnoses and all orders for this visit:    1. PAF (paroxysmal atrial fibrillation) (Primary)  -     ECG 12 Lead    2. Essential hypertension    3. Mixed hyperlipidemia    4. Hypercholesteremia  -     atorvastatin (LIPITOR) 20 MG tablet; Take 1 tablet by mouth Daily.  Dispense: 90 tablet; Refill: 3    5. Type 2 diabetes mellitus without complication, without long-term current use of insulin    6. Overweight    Other orders  -     metoprolol succinate XL (TOPROL-XL) 25 MG 24 hr tablet; Take 1 tablet by mouth Every Night.  Dispense: 90 tablet; Refill: 3             PAF:  EKG done today in regards shows continued sinus with normal QT. Will continue toprol XL nightly. Holter from 2023, showed no afib. She has remained on ASA alone, since she has remained in rhythm. Palpitations denied. TIA symptoms denied.     HTN:  Blood pressure stable. Same metoprolol therapy will be continued. Limited sodium advised.     Cardiac status: Workup done 2024 neg for ischemia, good LV function, no valve concerns noted. Continue ASA, no new workup urged as status stable.     Hyperlipidemia:  On statin therapy with lipitor, tolerance reported.  Same continued. FLP followed by your office. Labs from Feb showed lipids at goal.    DM: Diet controlled. Limiting carb intake. Most recent AIC according to patient at 7%. Is considering taking injection therapy. Will discuss with you if still above 6.7%.    BMI noted at 28.05, good cardiac diet with limited carbs, calories, and activity as tolerated advised.     Refills per request.     6 month  follow up recommended or sooner if needed.              Problems Addressed this Visit          Cardiac and Vasculature    Essential hypertension    Relevant Medications    metoprolol succinate XL (TOPROL-XL) 25 MG 24 hr tablet    Mixed hyperlipidemia    Relevant Medications    atorvastatin (LIPITOR) 20 MG tablet    Hypercholesteremia    Relevant Medications    atorvastatin (LIPITOR) 20 MG tablet    PAF (paroxysmal atrial fibrillation) - Primary    Relevant Medications    metoprolol succinate XL (TOPROL-XL) 25 MG 24 hr tablet    Other Relevant Orders    ECG 12 Lead     Other Visit Diagnoses         Type 2 diabetes mellitus without complication, without long-term current use of insulin          Overweight              Diagnoses         Codes Comments      PAF (paroxysmal atrial fibrillation)    -  Primary ICD-10-CM: I48.0  ICD-9-CM: 427.31       Essential hypertension     ICD-10-CM: I10  ICD-9-CM: 401.9       Mixed hyperlipidemia     ICD-10-CM: E78.2  ICD-9-CM: 272.2       Hypercholesteremia     ICD-10-CM: E78.00  ICD-9-CM: 272.0       Type 2 diabetes mellitus without complication, without long-term current use of insulin     ICD-10-CM: E11.9  ICD-9-CM: 250.00       Overweight     ICD-10-CM: E66.3  ICD-9-CM: 278.02                         Electronically signed by Martina Witt, APRN Amena 10, 2025 12:41 EDT